# Patient Record
Sex: MALE | Race: WHITE | NOT HISPANIC OR LATINO | URBAN - NONMETROPOLITAN AREA
[De-identification: names, ages, dates, MRNs, and addresses within clinical notes are randomized per-mention and may not be internally consistent; named-entity substitution may affect disease eponyms.]

---

## 2023-04-06 ENCOUNTER — TELEPHONE (OUTPATIENT)
Dept: PRIMARY CARE | Facility: CLINIC | Age: 78
End: 2023-04-06
Payer: COMMERCIAL

## 2023-04-06 DIAGNOSIS — E55.9 VITAMIN D DEFICIENCY: ICD-10-CM

## 2023-04-06 DIAGNOSIS — R53.83 OTHER FATIGUE: ICD-10-CM

## 2023-04-06 DIAGNOSIS — Z12.5 PROSTATE CANCER SCREENING: Primary | ICD-10-CM

## 2023-04-06 DIAGNOSIS — E78.5 DYSLIPIDEMIA: ICD-10-CM

## 2023-04-06 NOTE — TELEPHONE ENCOUNTER
Patient wife called wondering if you could put in lab orders for him to get labs done, there were none put in from his last visit with you in February.

## 2023-07-06 ENCOUNTER — HOSPITAL ENCOUNTER (OUTPATIENT)
Dept: DATA CONVERSION | Facility: HOSPITAL | Age: 78
End: 2023-07-06
Attending: OTOLARYNGOLOGY | Admitting: OTOLARYNGOLOGY
Payer: COMMERCIAL

## 2023-07-06 DIAGNOSIS — Z79.1 LONG TERM (CURRENT) USE OF NON-STEROIDAL ANTI-INFLAMMATORIES (NSAID): ICD-10-CM

## 2023-07-06 DIAGNOSIS — J34.9 UNSPECIFIED DISORDER OF NOSE AND NASAL SINUSES: ICD-10-CM

## 2023-07-06 DIAGNOSIS — E87.6 HYPOKALEMIA: ICD-10-CM

## 2023-07-06 DIAGNOSIS — E55.9 VITAMIN D DEFICIENCY, UNSPECIFIED: ICD-10-CM

## 2023-07-06 DIAGNOSIS — Z86.16 PERSONAL HISTORY OF COVID-19: ICD-10-CM

## 2023-07-06 DIAGNOSIS — H65.91 UNSPECIFIED NONSUPPURATIVE OTITIS MEDIA, RIGHT EAR: ICD-10-CM

## 2023-07-06 DIAGNOSIS — J34.3 HYPERTROPHY OF NASAL TURBINATES: ICD-10-CM

## 2023-07-06 DIAGNOSIS — H61.23 IMPACTED CERUMEN, BILATERAL: ICD-10-CM

## 2023-07-06 DIAGNOSIS — J34.2 DEVIATED NASAL SEPTUM: ICD-10-CM

## 2023-07-06 DIAGNOSIS — E78.5 HYPERLIPIDEMIA, UNSPECIFIED: ICD-10-CM

## 2023-07-06 DIAGNOSIS — R53.82 CHRONIC FATIGUE, UNSPECIFIED: ICD-10-CM

## 2023-07-06 DIAGNOSIS — J32.0 CHRONIC MAXILLARY SINUSITIS: ICD-10-CM

## 2023-07-06 DIAGNOSIS — H73.891 OTHER SPECIFIED DISORDERS OF TYMPANIC MEMBRANE, RIGHT EAR: ICD-10-CM

## 2023-07-06 DIAGNOSIS — Z88.5 ALLERGY STATUS TO NARCOTIC AGENT: ICD-10-CM

## 2023-07-17 PROBLEM — E78.5 DYSLIPIDEMIA: Status: ACTIVE | Noted: 2023-07-17

## 2023-07-17 PROBLEM — M85.80 OSTEOPENIA: Status: ACTIVE | Noted: 2023-07-17

## 2023-07-17 PROBLEM — J30.9 ALLERGIC RHINITIS: Status: ACTIVE | Noted: 2023-07-17

## 2023-07-17 PROBLEM — R53.82 CHRONIC FATIGUE: Status: ACTIVE | Noted: 2023-07-17

## 2023-07-17 PROBLEM — H61.23 BILATERAL IMPACTED CERUMEN: Status: RESOLVED | Noted: 2023-07-17 | Resolved: 2023-07-17

## 2023-07-17 PROBLEM — J18.9 PNEUMONIA: Status: RESOLVED | Noted: 2023-07-17 | Resolved: 2023-07-17

## 2023-07-17 PROBLEM — R09.82 POSTNASAL DISCHARGE: Status: RESOLVED | Noted: 2023-07-17 | Resolved: 2023-07-17

## 2023-07-17 PROBLEM — R94.31 ABNORMAL EKG: Status: RESOLVED | Noted: 2023-07-17 | Resolved: 2023-07-17

## 2023-07-17 PROBLEM — E55.9 VITAMIN D DEFICIENCY: Status: ACTIVE | Noted: 2023-07-17

## 2023-07-17 PROBLEM — U07.1 COVID-19 VIRUS INFECTION: Status: RESOLVED | Noted: 2023-07-17 | Resolved: 2023-07-17

## 2023-07-17 RX ORDER — ALBUTEROL SULFATE 0.83 MG/ML
2.5 SOLUTION RESPIRATORY (INHALATION) EVERY 8 HOURS PRN
COMMUNITY
Start: 2023-02-28 | End: 2023-07-18 | Stop reason: WASHOUT

## 2023-07-17 RX ORDER — ALBUTEROL SULFATE 90 UG/1
2 AEROSOL, METERED RESPIRATORY (INHALATION) 4 TIMES DAILY
COMMUNITY
Start: 2023-02-24 | End: 2023-07-18 | Stop reason: WASHOUT

## 2023-07-17 NOTE — PATIENT INSTRUCTIONS
Please go to the lab at [facility] to have your blood drawn (remember some labs -like lipid panel, require fasting beforehand, but you can have plenty of water, a cup of black coffee, and can take your morning medications).     Thank you for trusting me to be a part of your healthcare.    Take care! Gloria Song PA-C

## 2023-07-17 NOTE — PROGRESS NOTES
Subjective     HPI   Whitley Tom is a 78 y.o. year old male patient with presenting to clinic with concern for   Chief Complaint   Patient presents with    New Patient Visit     Establish care        Mr. Tom presents to clinic to establish care.   Last  colonoscopy?- declines. Had cologuard 2 years ago. Due next year 2024    Req labs Dr Neel jerome diag??  [After visit, records were received and there were no lab results received from the past 2 years. None from [facility], [facility] or  found either]    On Tums 500mg every day and 1 OTVC vit recommended 2000IU every day for osteopenia    Patient Active Problem List   Diagnosis    Allergic rhinitis    Chronic fatigue    Dyslipidemia    Osteopenia    Vitamin D deficiency       Past Medical History:   Diagnosis Date    Abnormal EKG 07/17/2023    Accident on farm     right arm Auger accident    Bilateral impacted cerumen 07/17/2023    [place]hest [place]     [facility] [place] [facility]    Diarrhea, unspecified 11/03/2020    Diarrhea of presumed infectious origin    Fall 2013    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, small brain bleed and hemothorax, fully recovered    Pneumonia 07/17/2023    Postnasal discharge 07/17/2023      Past Surgical History:   Procedure Laterality Date    OTHER SURGICAL HISTORY Left 03/15/2021    Knee surgery      Family History   Problem Relation Name Age of Onset    No Known Problems Other        Social History     Tobacco Use    Smoking status: Never    Smokeless tobacco: Never   Substance Use Topics    Alcohol use: Yes     Comment: rare/wine/beer        Current Outpatient Medications:     albuterol 2.5 mg /3 mL (0.083 %) nebulizer solution, Take 3 mL (2.5 mg) by nebulization every 8 hours if needed., Disp: , Rfl:     albuterol 90 mcg/actuation inhaler, Inhale 2 puffs 4 times a day., Disp: , Rfl:      Review of Systems  Review of Systems:   Constitutional: Denies fever  HEENT: Denies ST, earache  CVS: Denies Chest pain  Pulmonary: Denies wheezing,  "SOB  GI: Denies N/V  : Denies dysuria  Musculoskeletal:  Denies myalgia  Neuro: Denies focal weakness or numbness.  Skin: Denies Rashes.  *Review of Systems is negative unless otherwise mentioned in HPI or ROS above.    Objective   /76   Pulse 73   Temp 37 °C (98.6 °F)   Ht 1.905 m (6' 3\")   Wt 83.5 kg (184 lb)   SpO2 97%   BMI 23.00 kg/m²     Physical Exam  Physical exam:  /76   Pulse 73   Temp 37 °C (98.6 °F)   Ht 1.905 m (6' 3\")   Wt 83.5 kg (184 lb)   SpO2 97%   BMI 23.00 kg/m²  reviewed   Body mass index is 23 kg/m².   Constitutional: NAD.  Resting comfortably.  Head: Atraumatic, normocephalic.  EENT: deferred d/t masking  Cardiac: Regular rate & rhythm.   Pulmonary: Lungs clear bilat  GI: Soft, Nontender, nondistended.   Musculoskeletal: No peripheral edema.   Skin: No evidence of trauma. No rashes  Psych: Intact judgement and insight.    .Assessment/Plan     Problem List Items Addressed This Visit       Osteopenia - Primary    Relevant Orders    Vitamin D, Total     Other Visit Diagnoses       Routine general medical examination at a health care facility        Relevant Orders    CBC    Comprehensive Metabolic Panel    Vitamin D insufficiency        Relevant Orders    Vitamin D, Total    Borderline hyperlipidemia        Relevant Orders    TSH with reflex to Free T4 if abnormal    Lipid Panel    Prostate cancer screening        Relevant Orders    Prostate Specific Antigen, Screen            "

## 2023-07-18 ENCOUNTER — OFFICE VISIT (OUTPATIENT)
Dept: PRIMARY CARE | Facility: CLINIC | Age: 78
End: 2023-07-18
Payer: COMMERCIAL

## 2023-07-18 VITALS
HEIGHT: 75 IN | SYSTOLIC BLOOD PRESSURE: 120 MMHG | BODY MASS INDEX: 22.88 KG/M2 | OXYGEN SATURATION: 97 % | HEART RATE: 73 BPM | WEIGHT: 184 LBS | DIASTOLIC BLOOD PRESSURE: 76 MMHG | TEMPERATURE: 98.6 F

## 2023-07-18 DIAGNOSIS — M85.80 OSTEOPENIA, UNSPECIFIED LOCATION: Primary | ICD-10-CM

## 2023-07-18 DIAGNOSIS — Z00.00 ROUTINE GENERAL MEDICAL EXAMINATION AT A HEALTH CARE FACILITY: ICD-10-CM

## 2023-07-18 DIAGNOSIS — E55.9 VITAMIN D INSUFFICIENCY: ICD-10-CM

## 2023-07-18 DIAGNOSIS — E78.5 BORDERLINE HYPERLIPIDEMIA: ICD-10-CM

## 2023-07-18 DIAGNOSIS — Z12.5 PROSTATE CANCER SCREENING: ICD-10-CM

## 2023-07-18 PROCEDURE — 1160F RVW MEDS BY RX/DR IN RCRD: CPT | Performed by: PHYSICIAN ASSISTANT

## 2023-07-18 PROCEDURE — 1159F MED LIST DOCD IN RCRD: CPT | Performed by: PHYSICIAN ASSISTANT

## 2023-07-18 PROCEDURE — 99214 OFFICE O/P EST MOD 30 MIN: CPT | Performed by: PHYSICIAN ASSISTANT

## 2023-07-18 PROCEDURE — 1036F TOBACCO NON-USER: CPT | Performed by: PHYSICIAN ASSISTANT

## 2023-07-18 RX ORDER — CALCIUM CARBONATE 200(500)MG
1 TABLET,CHEWABLE ORAL DAILY
COMMUNITY

## 2023-07-18 RX ORDER — CHOLECALCIFEROL (VITAMIN D3) 50 MCG
50 TABLET ORAL DAILY
COMMUNITY

## 2023-07-18 ASSESSMENT — PATIENT HEALTH QUESTIONNAIRE - PHQ9
7. TROUBLE CONCENTRATING ON THINGS, SUCH AS READING THE NEWSPAPER OR WATCHING TELEVISION: NOT AT ALL
10. IF YOU CHECKED OFF ANY PROBLEMS, HOW DIFFICULT HAVE THESE PROBLEMS MADE IT FOR YOU TO DO YOUR WORK, TAKE CARE OF THINGS AT HOME, OR GET ALONG WITH OTHER PEOPLE: NOT DIFFICULT AT ALL
9. THOUGHTS THAT YOU WOULD BE BETTER OFF DEAD, OR OF HURTING YOURSELF: NOT AT ALL
5. POOR APPETITE OR OVEREATING: NOT AT ALL
3. TROUBLE FALLING OR STAYING ASLEEP OR SLEEPING TOO MUCH: NOT AT ALL
6. FEELING BAD ABOUT YOURSELF - OR THAT YOU ARE A FAILURE OR HAVE LET YOURSELF OR YOUR FAMILY DOWN: NOT AT ALL
SUM OF ALL RESPONSES TO PHQ QUESTIONS 1-9: 0
SUM OF ALL RESPONSES TO PHQ9 QUESTIONS 1 AND 2: 0
2. FEELING DOWN, DEPRESSED OR HOPELESS: NOT AT ALL
1. LITTLE INTEREST OR PLEASURE IN DOING THINGS: NOT AT ALL
8. MOVING OR SPEAKING SO SLOWLY THAT OTHER PEOPLE COULD HAVE NOTICED. OR THE OPPOSITE, BEING SO FIGETY OR RESTLESS THAT YOU HAVE BEEN MOVING AROUND A LOT MORE THAN USUAL: NOT AT ALL
4. FEELING TIRED OR HAVING LITTLE ENERGY: NOT AT ALL

## 2023-07-18 ASSESSMENT — ANXIETY QUESTIONNAIRES

## 2023-09-29 VITALS — BODY MASS INDEX: 23.3 KG/M2 | WEIGHT: 187.39 LBS | HEIGHT: 75 IN

## 2023-09-30 NOTE — H&P
"    History of Present Illness:   HPI:      Chief Complaint    \"Suggested by Primary (ear wax) sinus\"      Adult Risk ScreeningAdult Risk Screening_UH: There are no spiritual/cultural practices/values/needs that are important to know   Initial Fall Risk Screening:   ZAHRA has not fallen in the last 6 months. His fall did not result in injury. ZAHRA does not have a fear of falling. He does not need assistance with sitting, standing or walking. Does not need assistance walking in his home. He does not need assistance  in an unfamiliar setting. The patient is not using an assistive device.        Tobacco Screening: ZAHRA does not use tobacco.   Domestic Violence Screen: Does not feel threatened or abused physically, emotionally or sexually. Do you feel UNSAFE?   The patient feels safe in the home.   Depression/Suicide Screening:   During the past 2 weeks, the patient has not felt down, depressed or hopeless.    During the past 2 weeks, the patient has not felt little interest or pleasure in doing things.        History of Present Illness  Mr. Tom is a 76 yo M. He comes for nose sinus issues and ear wax.  He has lots of drainage from his sinuses most of the time, mostly postnasal.   It tickles his throat and causes him to cough. This has been going on for decades.  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    History of ear tubes, years ago.  Difficulty with hearing, ears feel full.    On examination, there was wax in ears bilaterally. Cleaning was done. RIght TM is retracted and there is fluid (+). LEft TM looks essentially normal.     Nasal septum is deviated to left anteriorly and to right superiorly and posteriorly  No visible purulent 1  postnasal discharge.     Dx:  1- Deviated nasal septum  2- RIght otitis media with effusion  3- wax build up bilateral (cleaned)    Plan:  1- septoplasty, RF turbinate reduction  2- insertion of right t -tube  3- " hearing testtest  4- clarifix cryotherapy for postnasal thick mucoid 1  discharge1,2  discharge    04.30.2023: CT scan shows deviated nasal septum to left anteriorly and severely deviated nasal septum to right posteriorly. Congestion of right inferior turbinate, left michael bullosa, bilateral mucosal thickenings at maxillary sinuses.    Modified plan is septoplasty, reduction of left michael bullosa, RF inferior turbinate reduction, clarifix cryotherapy for postnasal discharge, limited sinus surgery 66661, 33359.2        1 Amended By: Jesus Gil; Apr 30 2023 2:08 PM EST   2 Amended By: Jesus Gil; Apr 30 2023 2:17 PM EST    Review of Systems  Please see scanned review of systems document in the chart.       Active Problems   · Abnormal EKG (794.31) (R94.31)   · Acute bronchitis (466.0) (J20.9)   · Acute maxillary sinusitis (461.0) (J01.00)   · Allergic rhinitis (477.9) (J30.9)   · Bilateral impacted cerumen (380.4) (H61.23)   · Chronic fatigue (780.79) (R53.82)   · Colon cancer screening (V76.51) (Z12.11)   · Colonoscopy refused (V64.2) (Z53.20)   · COVID-19 virus infection (079.89) (U07.1)   · Diarrhea of presumed infectious origin (009.3) (R19.7)   · Dysfunction of both eustachian tubes (381.81) (H69.83)   · Dyslipidemia (272.4) (E78.5)   · Influenza vaccination declined (V64.06) (Z28.21)   · Non-seasonal allergic rhinitis due to other allergic trigger (477.8) (J30.89)   · Osteopenia (733.90) (M85.80)   · Pneumonia (486) (J18.9)   · Pre-operative clearance (V72.84) (Z01.818)   · Prostate cancer screening (V76.44) (Z12.5)   · Screening for condition (V82.9) (Z13.9)   · Vitamin D deficiency (268.9) (E55.9)    Past Medical History   · History of Diarrhea of presumed infectious origin (009.3) (R19.7)   · Resolved Date: 05 Nov 2020    Surgical History   · History of Knee surgery    Family History   · Family history of atrial fibrillation (V17.49) (Z82.49)    Social History   · Non-smoker (V49.89)  (Z78.9)    Allergies   · Percocet TABS  Recorded By: Viola Alonzo; 3/15/2021 8:45:00 AM    Current Meds    Medication Name Instruction  methylPREDNISolone 4 MG Oral Tablet Therapy Pack TAKE BY MOUTH AS DIRECTED INSIDE PACKAGE  No Reported Medications     Physical Exam  General appearance: Healthy-appearing, well-nourished, well groomed, in no acute distress.     Head and Face: Atraumatic with no masses, lesions, or scarring.     Salivary glands: No tenderness of the parotid glands or parotid masses.     No tenderness of the submandibular glands or submandibular masses.     Facial strength: Normal strength and symmetry, no synkinesis or facial tic.     Eyes: Conjunctivas look non-hyperemic bilaterally    Ears: Bilaterally ear canals look normal. Tympanic membranes intact, no hyperemia, fluid or retraction.     Nose: Mucosa looks normal. No purulent discharge. Septum deviated to left / right / essentially straight.    Oral Cavity/Mouth: Lips and tongue look normal.     Throat: No postnasal discharge. No tonsil hypertrophy. No hyperemia.    Neck: Symmetrical, trachea midline.     Pulmonary: Normal respiratory effort.     Lymphatic: No palpable pathologic lymph nodes at neck.     Neurological/Psychiatric: Orientation to person, place, and time: Normal.   Mood and affect: Normal.     Extremities: No clubbing.       Procedure  NASAL ENDOSCOPY 04.14.2023  0 degree nasal endoscope was advanced through patient's nasal cavities. On examination nasal septum is deviated to left anteriorly and to right superiorly and posteriorly. No polyps or purulent secretions were observed on both sides. No mass or ulceration  was found at nasopharynx.        EAR WAX REMOVAL 04.14.2023  Patient had bilateral ear wax. Using otoscope and small instrument(s) cleaning was done. Patient tolerated the procedure well.       Diagnoses/Problems   · Bilateral impacted cerumen (380.4) (H61.23)   · Deviated nasal septum (470) (J34.2)   · Allergic  rhinitis (477.9) (J30.9)   · Postnasal discharge (473.9) (R09.82)    Orders   · Respiratory Allergy Profile Region 5, IC; Status:Active; Requested for:14Apr2023;    · CT Sinus without Contrast; Status:Hold For - Scheduling; Requested for:14Apr2023;   Patient taking Metformin or Derivatives? : Unknown  Radiologist to Determine Optimal Study : Y  What are the patient's signs and symptoms? : deviated nasal septum difficulty with   breathing from nose despite using flonase spray    Patient Discussion/Summary    Mr. Tom is a 76 yo M. He comes for nose sinus issues and ear wax.  He has lots of drainage from his sinuses most of the time, mostly postnasal.   It tickles his throat and causes him to cough. This has been going on for decades.  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    History of ear tubes, years ago.  Difficulty with hearing, ears feel full.    On examination, there was wax in ears bilaterally. Cleaning was done. RIght TM is retracted and there is fluid (+). LEft TM looks essentially normal.     Nasal septum is deviated to left anteriorly and to right superiorly and posteriorly  No visible purulent 1  postnasal discharge.     Dx:  1- Deviated nasal septum  2- RIght otitis media with effusion  3- wax build up bilateral (cleaned)    Plan:  1- septoplasty, RF turbinate reduction  2- insertion of right t -tube  3- hearing test test  4- clarifix cryotherapy for postnasal thick mucoid 1  discharge1,2  discharge    04.30.2023: CT scan shows deviated nasal septum to left anteriorly and severely deviated nasal septum to right posteriorly. Congestion of right inferior turbinate, left michael bullosa, bilateral mucosal thickenings at maxillary sinuses.    Modified plan is septoplasty, reduction of left michael bullosa, RF inferior turbinate reduction, clarifix cryotherapy for postnasal discharge, limited sinus surgery 94287, 75479.2           Signatures  Electronically signed by : Jesus Gil MD; Apr 30 2023  2:20PM EST (Author)               Allergies:  ·  No Known Allergies :        Intolerances:  ·  Percocet : Unknown    Medications Prior to Admission:   Admission Medication Reconciliation has not been completed for this patient.    Objective:     Objective Information:        Pain reported at 7/6 10:20: 0 = None      Electronic Signatures:  Jesus Gil)  (Signed 06-Jul-2023 11:18)   Authored: History of Present Illness, Comorbidities,  Allergies, Medications Prior to Admission, Objective, Note Completion      Last Updated: 06-Jul-2023 11:18 by Jesus Gil)

## 2023-10-02 NOTE — OP NOTE
Post Operative Note:     PreOp Diagnosis: 1- deviated nasal septum, 2- michael  bullosa (left), 3- chronic postnasal discharge, 4- bilateral maxillary sinusitis, 5- congestion of right inferior turbinate, 6- retraction-adhesion of right tympani   Post-Procedure Diagnosis: 1- deviated nasal septum,   2- michael bullosa (left),   3- chronic postnasal discharge,   4- bilateral maxillary sinusitis,   5- congestion of left inferior turbinate   6- retraction-adhesion of right tympanic membrane  7- right otitis media with effusion   Procedure: 1. septoplasty  2. reduction of left michael bullosa  3. radiofrequency reduction of inferior turbinates  4. partial ethmoidectomy  5. maxillary antrostomy  6. clarifix cryotherapy  7. right myringotomy   Surgeon: Jesus Gil   Resident/Fellow/Other Assistant: none   Anesthesia: general   Estimated Blood Loss (mL): 75   Specimen: no   Findings: septum deviated to left anteriorly, deviated  to right posteriorly, left michael bullosa, bilateral marcos cells, right maxillary antrum was closed. Retraction-adhesion of right TM, right OME.     Attestation:   Note Completion:  Attending Attestation I performed the procedure without a resident   Comments/ Additional Findings    HISTORY:  Mr. Tom is a 76 yo  M. He comes for nose sinus issues and ear wax. He has lots of drainage from his sinuses most of the time, mostly postnasal. It tickles his throat and causes him to cough. This has been going on for decades. 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  History of ear tubes, years ago. Difficulty with hearing, ears feel full.   On examination, there was wax in ears bilaterally. Cleaning was done. Right TM is retracted and there is fluid (+). Left TM looks essentially normal.   Nasal septum is deviated to left anteriorly and to right superiorly and posteriorly  No visible purulent postnasal discharge.     Dx:  1- Deviated nasal  septum  2- Right otitis media with effusion  3- wax build up bilateral (cleaned)    Plan:  1- septoplasty, RF turbinate reduction  2- insertion of right t -tube  3- hearing test   4- clarifix cryotherapy for postnasal thick mucoid discharge  _______________________________________________________________________________________________________  04.30.2023: CT scan shows deviated nasal septum to left anteriorly and severely deviated nasal septum to right posteriorly. Congestion of right inferior turbinate, left michael bullosa, bilateral mucosal thickenings at maxillary sinuses.   Modified plan is septoplasty, reduction of left michael bullosa, RF inferior turbinate reduction, clarifix cryotherapy for postnasal discharge, limited sinus surgery 91244, 98963.2      DESCRIPTION OF THE PROCEDURE:  The patient was brought to the operative suite, placed supine on the operative table.  Time-out was called. Anesthesia was administered by Anesthesia Department.  Please refer to their records for details.  The patient was draped and prepped for the operation.   0 degree endoscope was advanced through patient's nasal cavity. Septum was deviated to right and left. Septal mucosa was infiltrated with lidocaine and epinephrine.  A left modified Zavalla incision was done.  Mucoperichondrial flaps were raised on both  sides.  Yohan knife was used to take out a quadrangular  portion of the cartilaginous septum anteriorly. [place] elevator, D freer elevator, Pituitary forceps, trucut forceps, and rj forceps were used to elevate-cut and remove the deviated portions  of cartilaginous and bony septum. L strut was left to support the dorsum and the tip of the nose.  0-degree endoscope was advanced through the patient's left nasal cavity. Left middle turbinate was medialized using [place] elevator. Backbiter forceps was advanced.  Left uncinate process was cut anteriorly using back-biter forceps.  Then, microdebrider  with 4 mm blade  was used to remove the left uncinate process. Left maxillary antrostomy was done. There was a marcos cell. It was opened. Maxillary ostium was widened. Left ethmoid bulla was opened using microdebrider device, partial ethmoidectomy was  done. Left middle turbinate was michael bullosa it was reduced from its lateral side. Inferior and lateral parts were reduced using radiofrequency device.    0-degree endoscope was advanced through the patient's right nasal cavity.  Right middle turbinate was medialized with freer elevator. Backbiter forceps was advanced. Right uncinate process was cut anteriorly with backbiting forceps.  Then, microdebrider  with 4 mm blade  was used to remove the uncinate process. Right maxillary sinus antrum was closed with a bony lamella and there was a marcos cell.  Marcos cell was opened. Right maxillary sinus ostium was identified and widened. Next, right ethmoid bulla  and anterior ethmoid cell(s) were opened using microdebrider device. Right partial ethmoidectomy was done.  Radiofrequency probe was advanced under endoscopic guidance. Radiofrequency was applied to inferior turbinates at multiple points  (max 20 robertson).   Patient has a history of chronic postnasal discharge. Clarifix cryotherapy was applied to posterior superior part of nasopharynx mucosa to destroy the glands producing thick mucus.   Hemopore was placed to middle meatus bilaterally and to medial parts of middle turbinates. Septum stapler was used to re-attach mucosal flaps of septum.  Septoplasty incision was sutured using 5.0 fast absorbing gut sutures. Merogel was placed to nasal  cavities.   Operating microscope was brought to patient's right ear. On examination, right TM was adhesive over promontorium and was severely retracted adhesive posteriorly. There was effusion in right middle ear. An incision was done to right TM anteroinferiorly,  part of the effusion was suctioned. I tried to place a tube, but my impression it  had the risk of developing cholesteatoma. I took the tube out and placed some merogel under the medial adhesive part of TM to prevent cholesteatoma formation in middle ear.   Procedure was concluded.     DISPOSITION:  Discharge home    MEDICATIONS-RECOMMENDATIONS:  1- Mupirocin ointment twice a day  2- Nasal Rinse  3- Pain medication as needed   4- Oral antibiotic tab    FOLLOW UP:  in 1 month in [place] [facility] office                Electronic Signatures:  Jesus Gil)  (Signed 06-Jul-2023 15:34)   Authored: Post Operative Note, Note Completion      Last Updated: 06-Jul-2023 15:34 by Jesus Gil)

## 2023-10-10 ENCOUNTER — OFFICE VISIT (OUTPATIENT)
Dept: OTOLARYNGOLOGY | Facility: CLINIC | Age: 78
End: 2023-10-10
Payer: COMMERCIAL

## 2023-10-10 DIAGNOSIS — H65.91 OTITIS MEDIA WITH EFFUSION, RIGHT: ICD-10-CM

## 2023-10-10 DIAGNOSIS — H69.92 DISORDER OF LEFT EUSTACHIAN TUBE: ICD-10-CM

## 2023-10-10 DIAGNOSIS — H65.90 OTITIS MEDIA WITH EFFUSION, UNSPECIFIED LATERALITY: Primary | ICD-10-CM

## 2023-10-10 PROCEDURE — 1160F RVW MEDS BY RX/DR IN RCRD: CPT | Performed by: OTOLARYNGOLOGY

## 2023-10-10 PROCEDURE — 69433 CREATE EARDRUM OPENING: CPT | Performed by: OTOLARYNGOLOGY

## 2023-10-10 PROCEDURE — 69420 INCISION OF EARDRUM: CPT | Performed by: OTOLARYNGOLOGY

## 2023-10-10 PROCEDURE — 99214 OFFICE O/P EST MOD 30 MIN: CPT | Performed by: OTOLARYNGOLOGY

## 2023-10-10 PROCEDURE — 1159F MED LIST DOCD IN RCRD: CPT | Performed by: OTOLARYNGOLOGY

## 2023-10-10 PROCEDURE — 1036F TOBACCO NON-USER: CPT | Performed by: OTOLARYNGOLOGY

## 2023-10-10 RX ORDER — OFLOXACIN 3 MG/ML
4 SOLUTION AURICULAR (OTIC) 2 TIMES DAILY
Qty: 5 ML | Refills: 0 | Status: SHIPPED | OUTPATIENT
Start: 2023-10-10 | End: 2023-10-20

## 2023-10-10 RX ORDER — DEXAMETHASONE SODIUM PHOSPHATE 1 MG/ML
SOLUTION/ DROPS OPHTHALMIC
Qty: 5 ML | Refills: 0 | Status: SHIPPED | OUTPATIENT
Start: 2023-10-10 | End: 2024-02-28 | Stop reason: WASHOUT

## 2023-10-10 NOTE — PROGRESS NOTES
Subjective   Patient ID: Whitley Tom is a 78 y.o. male who presents for follow up for ear and nose-sinus issues.    HPI  10/10/2023: No nose and sinus problems essentially. Left ear feels full. He recently had a slight cold.     On examination, right TM is severely retracted and there is fluid in right middle ear. Left TM is intact.    Dx:  1- right otitis media with effusion  2- retraction of right TM  3- left ETD    Today he had insertion of right ventilation tube and left needle myringotomy with intratympanic steroid injection.    Plan:  1- ear drops  2- follow up in 2 weeks    _________________________________________________________________      08.22.2023: He had surgery on 07.06.2023.     Diagnosis:   1- deviated nasal septum,   2- michael bullosa (left),   3- chronic postnasal discharge,   4- bilateral maxillary sinusitis,   5- congestion of left inferior turbinate   6- retraction-adhesion of right tympanic membrane   7- right otitis media with effusion      Procedure:   1. septoplasty   2. reduction of left michael bullosa   3. radiofrequency reduction of inferior turbinates   4. partial ethmoidectomy   5. maxillary antrostomy   6. clarifix cryotherapy   7. right myringotomy      Findings: septum deviated to left anteriorly, deviated to right posteriorly, left michael bullosa, bilateral marcos cells, right maxillary antrum was closed. Retraction-adhesion of right TM, right OME.     On examination, there was crusting over the left inferior turbinate. Cleaning was done. Prominent left nasal septal swell body (+). There was mucopurulent discharge coming out of right middle meatus. Synechia in middle meatus bilaterally. Patient did not use the ointment and nasal rinse as instructed.     Recommendation:  1â€“Daily nasal rinse and antibiotic ointment  2â€“follow-up in 2 months, consider lyses of synechia, if symptomatic.          "  ____________________________________________________________________________________________        Mr. Tom is a 78 yo M. He comes for nose sinus issues and ear wax.  He has lots of drainage from his sinuses most of the time, mostly postnasal.   It tickles his throat and causes him to cough. This has been going on for decades.  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     History of ear tubes, years ago.  Difficulty with hearing, ears feel full.     On examination, there was wax in ears bilaterally. Cleaning was done. RIght TM is retracted and there is fluid (+). LEft TM looks essentially normal.      Nasal septum is deviated to left anteriorly and to right superiorly and posteriorly  No visible purulent postnasal discharge.      Dx:  1- Deviated nasal septum  2- RIght otitis media with effusion  3- wax build up bilateral (cleaned)     Plan:  1- septoplasty, RF turbinate reduction  2- insertion of right t -tube  3- hearing test  4- clarifix cryotherapy for postnasal thick mucoid discharge     04.30.2023: CT scan shows deviated nasal septum to left anteriorly and severely deviated nasal septum to right posteriorly. Congestion of right inferior turbinate, left michael bullosa, bilateral mucosal thickenings at maxillary sinuses.     Modified plan is septoplasty, reduction of left michael bullosa, RF inferior turbinate reduction, clarifix cryotherapy for postnasal discharge, limited sinus surgery 21364, [id number].      Chief Complaint     \"Suggested by Primary (ear wax) sinus\"      Adult Risk ScreeningAdult Risk Screening_: There are no spiritual/cultural practices/values/needs that are important to know   Initial Fall Risk Screening:   ZAHRA has not fallen in the last 6 months. His fall did not result in injury. ZAHRA does not have a fear of falling. He does not need assistance with sitting, standing or walking. Does not need assistance walking in his " home. He does not need assistance in an unfamiliar setting. The patient is not using an assistive device.        Domestic Violence Screen: Does not feel threatened or abused physically, emotionally or sexually. Do you feel UNSAFE?   The patient feels safe in the home.   Depression/Suicide Screening:   During the past 2 weeks, the patient has not felt down, depressed or hopeless.    During the past 2 weeks, the patient has not felt little interest or pleasure in doing things.       Review of Systems  Ear fullness (+)    Objective   Physical Exam  (old exam note)     General appearance: Healthy-appearing, well-nourished, well groomed, in no acute distress.      Head and Face: Atraumatic with no masses, lesions, or scarring.      Salivary glands: No tenderness of the parotid glands or parotid masses.      No tenderness of the submandibular glands or submandibular masses.      Facial strength: Normal strength and symmetry, no synkinesis or facial tic.      Eyes: Conjunctivas look non-hyperemic bilaterally     Ears: Bilaterally ear canals look normal. Tympanic membranes intact, no hyperemia, fluid or retraction.      Nose: Mucosa looks normal. No purulent discharge. Septum deviated to left / right / essentially straight.     Oral Cavity/Mouth: Lips and tongue look normal.      Throat: No postnasal discharge. No tonsil hypertrophy. No hyperemia.     Neck: Symmetrical, trachea midline.      Pulmonary: Normal respiratory effort.      Lymphatic: No palpable pathologic lymph nodes at neck.      Neurological/Psychiatric: Orientation to person, place, and time: Normal.   Mood and affect: Normal.      Extremities: No clubbing.        Procedure  LEFT NEEDLE MYRINGOTOMY and INTRATYMPANIC STEROID INJECTION 10/10/2023  Operative microscope was brought to patient's left ear. Topical phenol was applied posteriorly, then myringotomy was done using 25 g needle and ~0.4 ml 10mg/ml dexamethasone was injected intratympanically. Antibiotic  drops were applied.    RIGHT VENTILATION TUBE INSERTION 10/10/2023  Operative microscope was brought to patient's right ear. Topical phenol was applied anteroinferiorly, then myringotomy was done using 21 g needle. Fluid was suctioned. Brad bobbin ventilation tube was inserted. Antibiotic drops were applied.    Procedure was concluded.       Assessment/Plan   10/10/2023: No nose and sinus problems essentially. Left ear feels full. He recently had a slight cold.     On examination, right TM is severely retracted and there is fluid in right middle ear. Left TM is intact.    Dx:  1- right otitis media with effusion  2- retraction of right TM  3- left ETD    Today he had insertion of right ventilation tube and left needle myringotomy with intratympanic steroid injection.    Plan:  1- ear drops  2- follow up in 2 weeks

## 2023-10-13 ENCOUNTER — OFFICE VISIT (OUTPATIENT)
Dept: OTOLARYNGOLOGY | Facility: CLINIC | Age: 78
End: 2023-10-13
Payer: COMMERCIAL

## 2023-10-13 DIAGNOSIS — Z96.22 MYRINGOTOMY TUBE STATUS: Primary | ICD-10-CM

## 2023-10-13 PROCEDURE — 1036F TOBACCO NON-USER: CPT | Performed by: OTOLARYNGOLOGY

## 2023-10-13 PROCEDURE — 1160F RVW MEDS BY RX/DR IN RCRD: CPT | Performed by: OTOLARYNGOLOGY

## 2023-10-13 PROCEDURE — 99213 OFFICE O/P EST LOW 20 MIN: CPT | Performed by: OTOLARYNGOLOGY

## 2023-10-13 PROCEDURE — 1159F MED LIST DOCD IN RCRD: CPT | Performed by: OTOLARYNGOLOGY

## 2023-10-13 NOTE — PROGRESS NOTES
Subjective   Patient ID: Whitley Tom is a 78 y.o. male who presents for follow up for ear and nose-sinus issues.    [facility]  10/13/2023: He comes for follow up. Nose and sinuses feel good. On examination nasal passages look open bilaterally. Tube in right TM, looks open. No granulation or discharge. Pinpoint opening at left TM posterosuperiorly.     Plan:  1- hearing test  2- follow up in 6 months    _________________________________________________________________    10/10/2023: No nose and sinus problems essentially. Left ear feels full. He recently had a slight cold.     On examination, right TM is severely retracted and there is fluid in right middle ear. Left TM is intact.    Dx:  1- right otitis media with effusion  2- retraction of right TM  3- left ETD    Today he had insertion of right ventilation tube and left needle myringotomy with intratympanic steroid injection.    Plan:  1- ear drops  2- follow up in 2 weeks    _________________________________________________________________      08.22.2023: He had surgery on 07.06.2023.     Diagnosis:   1- deviated nasal septum,   2- michael bullosa (left),   3- chronic postnasal discharge,   4- bilateral maxillary sinusitis,   5- congestion of left inferior turbinate   6- retraction-adhesion of right tympanic membrane   7- right otitis media with effusion      Procedure:   1. septoplasty   2. reduction of left michael bullosa   3. radiofrequency reduction of inferior turbinates   4. partial ethmoidectomy   5. maxillary antrostomy   6. clarifix cryotherapy   7. right myringotomy      Findings: septum deviated to left anteriorly, deviated to right posteriorly, left michael bullosa, bilateral marcos cells, right maxillary antrum was closed. Retraction-adhesion of right TM, right OME.     On examination, there was crusting over the left inferior turbinate. Cleaning was done. Prominent left nasal septal swell body (+). There was mucopurulent discharge coming out of right  "middle meatus. Synechia in middle meatus bilaterally. Patient did not use the ointment and nasal rinse as instructed.     Recommendation:  1â€“Daily nasal rinse and antibiotic ointment  2â€“follow-up in 2 months, consider lyses of synechia, if symptomatic.           ____________________________________________________________________________________________        Mr. Tom is a 78 yo M. He comes for nose sinus issues and ear wax.  He has lots of drainage from his sinuses most of the time, mostly postnasal.   It tickles his throat and causes him to cough. This has been going on for decades.  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     History of ear tubes, years ago.  Difficulty with hearing, ears feel full.     On examination, there was wax in ears bilaterally. Cleaning was done. RIght TM is retracted and there is fluid (+). LEft TM looks essentially normal.      Nasal septum is deviated to left anteriorly and to right superiorly and posteriorly  No visible purulent postnasal discharge.      Dx:  1- Deviated nasal septum  2- RIght otitis media with effusion  3- wax build up bilateral (cleaned)     Plan:  1- septoplasty, RF turbinate reduction  2- insertion of right t -tube  3- hearing test  4- clarifix cryotherapy for postnasal thick mucoid discharge     04.30.2023: CT scan shows deviated nasal septum to left anteriorly and severely deviated nasal septum to right posteriorly. Congestion of right inferior turbinate, left michael bullosa, bilateral mucosal thickenings at maxillary sinuses.     Modified plan is septoplasty, reduction of left michael bullosa, RF inferior turbinate reduction, clarifix cryotherapy for postnasal discharge, limited sinus surgery 30501, [id number].      Chief Complaint     \"Suggested by Primary (ear wax) sinus\"      Adult Risk ScreeningAdult Risk Screening_[facility]: There are no spiritual/cultural practices/values/needs that are important " to know   Initial Fall Risk Screening:   ZAHRA has not fallen in the last 6 months. His fall did not result in injury. ZAHRA does not have a fear of falling. He does not need assistance with sitting, standing or walking. Does not need assistance walking in his home. He does not need assistance in an unfamiliar setting. The patient is not using an assistive device.        Domestic Violence Screen: Does not feel threatened or abused physically, emotionally or sexually. Do you feel UNSAFE?   The patient feels safe in the home.   Depression/Suicide Screening:   During the past 2 weeks, the patient has not felt down, depressed or hopeless.    During the past 2 weeks, the patient has not felt little interest or pleasure in doing things.       Review of Systems  History of rar fullness (+)    Objective   Physical Exam  (old exam note)     General appearance: Healthy-appearing, well-nourished, well groomed, in no acute distress.      Head and Face: Atraumatic with no masses, lesions, or scarring.      Salivary glands: No tenderness of the parotid glands or parotid masses.      No tenderness of the submandibular glands or submandibular masses.      Facial strength: Normal strength and symmetry, no synkinesis or facial tic.      Eyes: Conjunctivas look non-hyperemic bilaterally     Ears: Bilaterally ear canals look normal. Tympanic membranes intact, no hyperemia, fluid or retraction.      Nose: Mucosa looks normal. No purulent discharge. Septum deviated to left / right / essentially straight.     Oral Cavity/Mouth: Lips and tongue look normal.      Throat: No postnasal discharge. No tonsil hypertrophy. No hyperemia.     Neck: Symmetrical, trachea midline.      Pulmonary: Normal respiratory effort.      Lymphatic: No palpable pathologic lymph nodes at neck.      Neurological/Psychiatric: Orientation to person, place, and time: Normal.   Mood and affect: Normal.      Extremities: No clubbing.        Procedure  LEFT NEEDLE  MYRINGOTOMY and INTRATYMPANIC STEROID INJECTION 10/10/2023  Operative microscope was brought to patient's left ear. Topical phenol was applied posteriorly, then myringotomy was done using 25 g needle and ~0.4 ml 10mg/ml dexamethasone was injected intratympanically. Antibiotic drops were applied.    RIGHT VENTILATION TUBE INSERTION 10/10/2023  Operative microscope was brought to patient's right ear. Topical phenol was applied anteroinferiorly, then myringotomy was done using 21 g needle. Fluid was suctioned. Brad bobbin ventilation tube was inserted. Antibiotic drops were applied.    Procedure was concluded.       Assessment/Plan   10/13/2023: He comes for follow up. Nose and sinuses feel good. On examination nasal passages look open bilaterally. Tube in right TM, looks open. No granulation or discharge. Pinpoint opening at left TM posterosuperiorly.     Plan:  1- hearing test  2- follow up in 6 months    _________________________________________________________________    10/10/2023: No nose and sinus problems essentially. Left ear feels full. He recently had a slight cold.     On examination, right TM is severely retracted and there is fluid in right middle ear. Left TM is intact.    Dx:  1- right otitis media with effusion  2- retraction of right TM  3- left ETD    Today he had insertion of right ventilation tube and left needle myringotomy with intratympanic steroid injection.    Plan:  1- ear drops  2- follow up in 2 weeks

## 2023-10-27 ENCOUNTER — APPOINTMENT (OUTPATIENT)
Dept: OTOLARYNGOLOGY | Facility: CLINIC | Age: 78
End: 2023-10-27
Payer: COMMERCIAL

## 2023-11-16 ENCOUNTER — TELEPHONE (OUTPATIENT)
Dept: HEMATOLOGY/ONCOLOGY | Facility: HOSPITAL | Age: 78
End: 2023-11-16
Payer: COMMERCIAL

## 2023-11-16 NOTE — TELEPHONE ENCOUNTER
Received paper order for Prolia from Dr. Tai Shaikh. Sent order to pharmacy to enter into [employer]. Email sent to pre-cert to start auth. Per wife, first dose of Prolia. Labs needed prior. Per order, okay to use labs dated 11/9/23. Reached out to wife to schedule. Patient requested for this year. Scheduled for 12/4/23 at 8:30 AM. Wife verbalized and agreed to appointment.

## 2023-11-27 ENCOUNTER — OFFICE VISIT (OUTPATIENT)
Dept: OTOLARYNGOLOGY | Facility: CLINIC | Age: 78
End: 2023-11-27
Payer: COMMERCIAL

## 2023-11-27 ENCOUNTER — CLINICAL SUPPORT (OUTPATIENT)
Dept: AUDIOLOGY | Facility: CLINIC | Age: 78
End: 2023-11-27
Payer: COMMERCIAL

## 2023-11-27 DIAGNOSIS — H90.6 MIXED CONDUCTIVE AND SENSORINEURAL HEARING LOSS OF BOTH EARS: Primary | ICD-10-CM

## 2023-11-27 DIAGNOSIS — H91.93 DECREASED HEARING OF BOTH EARS: ICD-10-CM

## 2023-11-27 DIAGNOSIS — R94.128: ICD-10-CM

## 2023-11-27 DIAGNOSIS — R94.120 ABNORMALLY COMPLIANT LEFT MIDDLE EAR SYSTEM WITH TYPE AD TYMPANOGRAM CURVE: ICD-10-CM

## 2023-11-27 DIAGNOSIS — Z96.22 MYRINGOTOMY TUBE STATUS: Primary | ICD-10-CM

## 2023-11-27 PROCEDURE — 92567 TYMPANOMETRY: CPT | Performed by: AUDIOLOGIST

## 2023-11-27 PROCEDURE — 1126F AMNT PAIN NOTED NONE PRSNT: CPT | Performed by: OTOLARYNGOLOGY

## 2023-11-27 PROCEDURE — 1160F RVW MEDS BY RX/DR IN RCRD: CPT | Performed by: OTOLARYNGOLOGY

## 2023-11-27 PROCEDURE — 99213 OFFICE O/P EST LOW 20 MIN: CPT | Performed by: OTOLARYNGOLOGY

## 2023-11-27 PROCEDURE — 92557 COMPREHENSIVE HEARING TEST: CPT | Performed by: AUDIOLOGIST

## 2023-11-27 PROCEDURE — 1159F MED LIST DOCD IN RCRD: CPT | Performed by: OTOLARYNGOLOGY

## 2023-11-27 PROCEDURE — 1036F TOBACCO NON-USER: CPT | Performed by: OTOLARYNGOLOGY

## 2023-11-27 RX ORDER — OFLOXACIN 3 MG/ML
SOLUTION AURICULAR (OTIC)
Qty: 5 ML | Refills: 0 | Status: SHIPPED | OUTPATIENT
Start: 2023-11-27 | End: 2024-05-28 | Stop reason: WASHOUT

## 2023-11-27 RX ORDER — DEXAMETHASONE SODIUM PHOSPHATE 1 MG/ML
SOLUTION/ DROPS OPHTHALMIC
Qty: 5 ML | Refills: 0 | Status: SHIPPED | OUTPATIENT
Start: 2023-11-27 | End: 2024-05-28 | Stop reason: WASHOUT

## 2023-11-27 ASSESSMENT — ENCOUNTER SYMPTOMS
DEPRESSION: 0
OCCASIONAL FEELINGS OF UNSTEADINESS: 0
LOSS OF SENSATION IN FEET: 0

## 2023-11-27 ASSESSMENT — PAIN - FUNCTIONAL ASSESSMENT: PAIN_FUNCTIONAL_ASSESSMENT: 0-10

## 2023-11-27 ASSESSMENT — PAIN SCALES - GENERAL: PAINLEVEL_OUTOF10: 0 - NO PAIN

## 2023-11-27 NOTE — PROGRESS NOTES
Whitley Tom, age 78 years, is here today for a hearing evaluation.     Difficulty hearing - yes, both ears (left ear worse)  Tinnitus - no  Excessive noise exposure - yes, occupational   Chronic ear infections - yes, childhood  Ear pain - no  Ear drainage - no  Past ear surgery - yes, ear ventilation tube in the right ear and a tube in the past (he does not recall)  Vertigo - no  Dizziness - no  Past hearing aid use - no  Family history - no    Appointment time: 13:30-14    Otoscopy revealed clear ear canals with visual inspection of the tympanic membranes bilaterally.    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    Word discrimination:  Right ear - excellent (100%)  Left ear - excellent (92%)    Tympanometry:  Right ear - Type B, no pressure peak with a large ear canal volume (indicative of a patent ear ventilation tube)   Left ear - Type Ad, eardrum hypermobility with normal ear canal volume and normal middle ear pressure    Ipsilateral acoustic reflexes:  Right ear - absent at 1000 Hz  Left ear - present 500-4000 Hz     Recommendations:  1) Follow up with Dr Gil regarding asymmetric mixed hearing loss (primarily sensorineural) - cannot rule out retrocochlear dysfunction  2) Consider hearing aids  3) Re-evaluate hearing annually, to monitor, or sooner if a change in hearing is noted

## 2023-11-27 NOTE — PROGRESS NOTES
Subjective   Patient ID: Whitley Tom is a 78 y.o. male who presents for follow up for ear and nose-sinus issues.    HPI    11.27.2023: He had his hearing test today.  At left ear there is sloping type of hearing loss at high frequencies and mild slope at right ear at high and at low frequencies.  He feels like his hearing at left is not good good as his hearing at right.  Since most of his problem is sensorineural, I recommend to consider using a hearing aid for the left ear.  On examination, ventilation tube seems to be touching right middle ear mucosa, which may cause chronic inflammation over time.    Recommendations:  1-use eardrops once a week to suppress foreign body reaction at right ear  2-consider using a hearing aid for the left ear    _________________________________________________________________    10/13/2023: He comes for follow up. Nose and sinuses feel good. On examination nasal passages look open bilaterally. Tube in right TM, looks open. No granulation or discharge. Pinpoint opening at left TM posterosuperiorly.     Plan:  1- hearing test  2- follow up in 6 months    _________________________________________________________________    10/10/2023: No nose and sinus problems essentially. Left ear feels full. He recently had a slight cold.     On examination, right TM is severely retracted and there is fluid in right middle ear. Left TM is intact.    Dx:  1- right otitis media with effusion  2- retraction of right TM  3- left ETD    Today he had insertion of right ventilation tube and left needle myringotomy with intratympanic steroid injection.    Plan:  1- ear drops  2- follow up in 2 weeks    _________________________________________________________________      08.22.2023: He had surgery on 07.06.2023.     Diagnosis:   1- deviated nasal septum,   2- michael bullosa (left),   3- chronic postnasal discharge,   4- bilateral maxillary sinusitis,   5- congestion of left inferior turbinate   6-  retraction-adhesion of right tympanic membrane   7- right otitis media with effusion      Procedure:   1. septoplasty   2. reduction of left michael bullosa   3. radiofrequency reduction of inferior turbinates   4. partial ethmoidectomy   5. maxillary antrostomy   6. clarifix cryotherapy   7. right myringotomy      Findings: septum deviated to left anteriorly, deviated to right posteriorly, left michael bullosa, bilateral marcos cells, right maxillary antrum was closed. Retraction-adhesion of right TM, right OME.     On examination, there was crusting over the left inferior turbinate. Cleaning was done. Prominent left nasal septal swell body (+). There was mucopurulent discharge coming out of right middle meatus. Synechia in middle meatus bilaterally. Patient did not use the ointment and nasal rinse as instructed.     Recommendation:  1â€“Daily nasal rinse and antibiotic ointment  2â€“follow-up in 2 months, consider lyses of synechia, if symptomatic.           ____________________________________________________________________________________________        Mr. Tom is a 76 yo M. He comes for nose sinus issues and ear wax.  He has lots of drainage from his sinuses most of the time, mostly postnasal.   It tickles his throat and causes him to cough. This has been going on for decades.  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     History of ear tubes, years ago.  Difficulty with hearing, ears feel full.     On examination, there was wax in ears bilaterally. Cleaning was done. RIght TM is retracted and there is fluid (+). LEft TM looks essentially normal.      Nasal septum is deviated to left anteriorly and to right superiorly and posteriorly  No visible purulent postnasal discharge.      Dx:  1- Deviated nasal septum  2- RIght otitis media with effusion  3- wax build up bilateral (cleaned)     Plan:  1- septoplasty, RF turbinate reduction  2- insertion of  "right t -tube  3- hearing test  4- clarifix cryotherapy for postnasal thick mucoid discharge   _________________________________________________________________    04.30.2023: CT scan shows deviated nasal septum to left anteriorly and severely deviated nasal septum to right posteriorly. Congestion of right inferior turbinate, left mihcael bullosa, bilateral mucosal thickenings at maxillary sinuses.     Modified plan is septoplasty, reduction of left michael bullosa, RF inferior turbinate reduction, clarifix cryotherapy for postnasal discharge, limited sinus surgery [id number], [id number].      Chief Complaint     \"Suggested by Primary (ear wax) sinus\"      Adult Risk ScreeningAdult Risk Screening_UH: There are no spiritual/cultural practices/values/needs that are important to know   Initial Fall Risk Screening:   ZAHRA has not fallen in the last 6 months. His fall did not result in injury. ZAHRA does not have a fear of falling. He does not need assistance with sitting, standing or walking. Does not need assistance walking in his home. He does not need assistance in an unfamiliar setting. The patient is not using an assistive device.        Domestic Violence Screen: Does not feel threatened or abused physically, emotionally or sexually. Do you feel UNSAFE?   The patient feels safe in the home.   Depression/Suicide Screening:   During the past 2 weeks, the patient has not felt down, depressed or hopeless.    During the past 2 weeks, the patient has not felt little interest or pleasure in doing things.       Review of Systems  History of rar fullness (+)    Objective   Physical Exam  (old exam note)     General appearance: Healthy-appearing, well-nourished, well groomed, in no acute distress.      Head and Face: Atraumatic with no masses, lesions, or scarring.      Salivary glands: No tenderness of the parotid glands or parotid masses.      No tenderness of the submandibular glands or submandibular masses.      Facial strength: " Normal strength and symmetry, no synkinesis or facial tic.      Eyes: Conjunctivas look non-hyperemic bilaterally     Ears: Bilaterally ear canals look normal. Tympanic membranes intact, no hyperemia, fluid or retraction.      Nose: Mucosa looks normal. No purulent discharge. Septum deviated to left / right / essentially straight.     Oral Cavity/Mouth: Lips and tongue look normal.      Throat: No postnasal discharge. No tonsil hypertrophy. No hyperemia.     Neck: Symmetrical, trachea midline.      Pulmonary: Normal respiratory effort.      Lymphatic: No palpable pathologic lymph nodes at neck.      Neurological/Psychiatric: Orientation to person, place, and time: Normal.   Mood and affect: Normal.      Extremities: No clubbing.        Procedure  LEFT NEEDLE MYRINGOTOMY and INTRATYMPANIC STEROID INJECTION 10/10/2023  Operative microscope was brought to patient's left ear. Topical phenol was applied posteriorly, then myringotomy was done using 25 g needle and ~0.4 ml 10mg/ml dexamethasone was injected intratympanically. Antibiotic drops were applied.    RIGHT VENTILATION TUBE INSERTION 10/10/2023  Operative microscope was brought to patient's right ear. Topical phenol was applied anteroinferiorly, then myringotomy was done using 21 g needle. Fluid was suctioned. Brad bobbin ventilation tube was inserted. Antibiotic drops were applied.    Procedure was concluded.       Assessment/Plan     11.27.2023: He had his hearing test today.  At left ear there is sloping type of hearing loss at high frequencies and mild slope at right ear at high and at low frequencies.  He feels like his hearing at left is not good good as his hearing at right.  Since most of his problem is sensorineural, I recommend to consider using a hearing aid for the left ear.  On examination, ventilation tube seems to be touching right middle ear mucosa, which may cause chronic inflammation over time.    Recommendations:  1-use eardrops once a week to  suppress foreign body reaction at right ear  2-consider using a hearing aid for the left ear    _________________________________________________________________    10/13/2023: He comes for follow up. Nose and sinuses feel good. On examination nasal passages look open bilaterally. Tube in right TM, looks open. No granulation or discharge. Pinpoint opening at left TM posterosuperiorly.     Plan:  1- hearing test  2- follow up in 6 months    _________________________________________________________________    10/10/2023: No nose and sinus problems essentially. Left ear feels full. He recently had a slight cold.     On examination, right TM is severely retracted and there is fluid in right middle ear. Left TM is intact.    Dx:  1- right otitis media with effusion  2- retraction of right TM  3- left ETD    Today he had insertion of right ventilation tube and left needle myringotomy with intratympanic steroid injection.    Plan:  1- ear drops  2- follow up in 2 weeks

## 2023-12-01 ENCOUNTER — TELEPHONE (OUTPATIENT)
Dept: HEMATOLOGY/ONCOLOGY | Facility: HOSPITAL | Age: 78
End: 2023-12-01
Payer: COMMERCIAL

## 2023-12-01 PROBLEM — M81.0 OSTEOPOROSIS: Status: ACTIVE | Noted: 2023-12-01

## 2023-12-01 RX ORDER — DIPHENHYDRAMINE HYDROCHLORIDE 50 MG/ML
50 INJECTION INTRAMUSCULAR; INTRAVENOUS AS NEEDED
Status: CANCELLED | OUTPATIENT
Start: 2023-12-04

## 2023-12-01 RX ORDER — EPINEPHRINE 0.3 MG/.3ML
0.3 INJECTION SUBCUTANEOUS EVERY 5 MIN PRN
Status: CANCELLED | OUTPATIENT
Start: 2023-12-04

## 2023-12-01 RX ORDER — ALBUTEROL SULFATE 0.83 MG/ML
3 SOLUTION RESPIRATORY (INHALATION) AS NEEDED
Status: CANCELLED | OUTPATIENT
Start: 2023-12-04

## 2023-12-01 RX ORDER — FAMOTIDINE 10 MG/ML
20 INJECTION INTRAVENOUS ONCE AS NEEDED
Status: CANCELLED | OUTPATIENT
Start: 2023-12-04

## 2023-12-01 NOTE — TELEPHONE ENCOUNTER
Patient scheduled for [facility] on Monday, 12/4/23 at 8:30 AM. Pre-cert is still pending review. Reached out to patient to inform him of pre-cert status. Left detailed message, with call back number, to call back at earliest convenience to reschedule.

## 2023-12-04 ENCOUNTER — APPOINTMENT (OUTPATIENT)
Dept: HEMATOLOGY/ONCOLOGY | Facility: HOSPITAL | Age: 78
End: 2023-12-04
Payer: COMMERCIAL

## 2023-12-04 NOTE — TELEPHONE ENCOUNTER
Patient scheduled for Prolia this morning at 8:30 AM. Pre-cert is still pending review. Reached out to patient to inform him of pre-cert status. Spoke with wife, Gricelda. Informed her appointment needs to be rescheduled due to pre-cert status. Rescheduled patient to next Tuesday, 12/12/23 at 8:30 AM. Wife verbalized understanding and agreed to appointment change.

## 2023-12-12 ENCOUNTER — INFUSION (OUTPATIENT)
Dept: HEMATOLOGY/ONCOLOGY | Facility: HOSPITAL | Age: 78
End: 2023-12-12
Payer: COMMERCIAL

## 2023-12-12 VITALS
HEART RATE: 82 BPM | DIASTOLIC BLOOD PRESSURE: 83 MMHG | BODY MASS INDEX: 22.98 KG/M2 | WEIGHT: 183.86 LBS | OXYGEN SATURATION: 98 % | TEMPERATURE: 97.5 F | SYSTOLIC BLOOD PRESSURE: 133 MMHG | RESPIRATION RATE: 16 BRPM

## 2023-12-12 DIAGNOSIS — M81.0 OSTEOPOROSIS, UNSPECIFIED OSTEOPOROSIS TYPE, UNSPECIFIED PATHOLOGICAL FRACTURE PRESENCE: ICD-10-CM

## 2023-12-12 PROCEDURE — 2500000004 HC RX 250 GENERAL PHARMACY W/ HCPCS (ALT 636 FOR OP/ED): Mod: JZ | Performed by: INTERNAL MEDICINE

## 2023-12-12 PROCEDURE — 96372 THER/PROPH/DIAG INJ SC/IM: CPT

## 2023-12-12 RX ORDER — DIPHENHYDRAMINE HYDROCHLORIDE 50 MG/ML
50 INJECTION INTRAMUSCULAR; INTRAVENOUS AS NEEDED
Status: CANCELLED | OUTPATIENT
Start: 2023-12-12

## 2023-12-12 RX ORDER — ALBUTEROL SULFATE 0.83 MG/ML
3 SOLUTION RESPIRATORY (INHALATION) AS NEEDED
Status: CANCELLED | OUTPATIENT
Start: 2023-12-12

## 2023-12-12 RX ORDER — FAMOTIDINE 10 MG/ML
20 INJECTION INTRAVENOUS ONCE AS NEEDED
Status: CANCELLED | OUTPATIENT
Start: 2023-12-12

## 2023-12-12 RX ORDER — EPINEPHRINE 0.3 MG/.3ML
0.3 INJECTION SUBCUTANEOUS EVERY 5 MIN PRN
Status: CANCELLED | OUTPATIENT
Start: 2023-12-12

## 2023-12-12 RX ADMIN — DENOSUMAB 60 MG: 60 INJECTION SUBCUTANEOUS at 08:37

## 2023-12-12 ASSESSMENT — PATIENT HEALTH QUESTIONNAIRE - PHQ9
SUM OF ALL RESPONSES TO PHQ9 QUESTIONS 1 & 2: 0
2. FEELING DOWN, DEPRESSED OR HOPELESS: NOT AT ALL
1. LITTLE INTEREST OR PLEASURE IN DOING THINGS: NOT AT ALL

## 2023-12-12 ASSESSMENT — COLUMBIA-SUICIDE SEVERITY RATING SCALE - C-SSRS
2. HAVE YOU ACTUALLY HAD ANY THOUGHTS OF KILLING YOURSELF?: NO
6. HAVE YOU EVER DONE ANYTHING, STARTED TO DO ANYTHING, OR PREPARED TO DO ANYTHING TO END YOUR LIFE?: NO
1. IN THE PAST MONTH, HAVE YOU WISHED YOU WERE DEAD OR WISHED YOU COULD GO TO SLEEP AND NOT WAKE UP?: NO

## 2023-12-12 ASSESSMENT — PAIN SCALES - GENERAL: PAINLEVEL: 0-NO PAIN

## 2024-01-02 ENCOUNTER — OFFICE VISIT (OUTPATIENT)
Dept: PRIMARY CARE | Facility: CLINIC | Age: 79
End: 2024-01-02
Payer: COMMERCIAL

## 2024-01-02 VITALS
SYSTOLIC BLOOD PRESSURE: 125 MMHG | HEIGHT: 75 IN | WEIGHT: 184.4 LBS | DIASTOLIC BLOOD PRESSURE: 80 MMHG | TEMPERATURE: 97.3 F | HEART RATE: 74 BPM | BODY MASS INDEX: 22.93 KG/M2 | OXYGEN SATURATION: 98 %

## 2024-01-02 DIAGNOSIS — J02.9 SORE THROAT: ICD-10-CM

## 2024-01-02 DIAGNOSIS — J01.00 ACUTE MAXILLARY SINUSITIS, RECURRENCE NOT SPECIFIED: Primary | ICD-10-CM

## 2024-01-02 LAB — POC RAPID STREP: NEGATIVE

## 2024-01-02 PROCEDURE — 99213 OFFICE O/P EST LOW 20 MIN: CPT | Performed by: FAMILY MEDICINE

## 2024-01-02 PROCEDURE — 1126F AMNT PAIN NOTED NONE PRSNT: CPT | Performed by: FAMILY MEDICINE

## 2024-01-02 PROCEDURE — 87880 STREP A ASSAY W/OPTIC: CPT | Performed by: FAMILY MEDICINE

## 2024-01-02 PROCEDURE — 1036F TOBACCO NON-USER: CPT | Performed by: FAMILY MEDICINE

## 2024-01-02 PROCEDURE — 1159F MED LIST DOCD IN RCRD: CPT | Performed by: FAMILY MEDICINE

## 2024-01-02 RX ORDER — AMOXICILLIN AND CLAVULANATE POTASSIUM 875; 125 MG/1; MG/1
875 TABLET, FILM COATED ORAL 2 TIMES DAILY
Qty: 20 TABLET | Refills: 0 | Status: SHIPPED | OUTPATIENT
Start: 2024-01-02 | End: 2024-01-14 | Stop reason: WASHOUT

## 2024-01-02 ASSESSMENT — ENCOUNTER SYMPTOMS
DEPRESSION: 0
LOSS OF SENSATION IN FEET: 0
OCCASIONAL FEELINGS OF UNSTEADINESS: 0

## 2024-01-02 ASSESSMENT — PATIENT HEALTH QUESTIONNAIRE - PHQ9
1. LITTLE INTEREST OR PLEASURE IN DOING THINGS: NOT AT ALL
SUM OF ALL RESPONSES TO PHQ9 QUESTIONS 1 AND 2: 0
2. FEELING DOWN, DEPRESSED OR HOPELESS: NOT AT ALL

## 2024-01-02 NOTE — PROGRESS NOTES
"Subjective   Patient ID: Whitley Tom is a 78 y.o. male who presents for Sore Throat (Started Saturday, nothing helps).      HPI  Patient of Gloria's who is out of the office today, has been having sore throat and cough x 5 days, getting worse. No sob or wheezing. Has sinus pressure and headache. No fever. No body aches, fatigue. No nausea/vomiting/diarrhea    Review of Systems  As per HPI    /80   Pulse 74   Temp 36.3 °C (97.3 °F)   Ht 1.905 m (6' 3\")   Wt 83.6 kg (184 lb 6.4 oz)   SpO2 98%   BMI 23.05 kg/m²       Objective   Physical Exam  Gen: NAD, alert  Head: normocephalic/atraumatic  Eyes: conjunctivae normal  Ears: canals clear bilaterally, TM normal   Nose: external nose normal , maxillary sinus tenderness present  Oropharynx: erythematous posterior oropharynx  Resp: decreased breath sounds  CVS: Regular rate and rhythm  Abdomen: soft, NT, ND  Ext: no edema, NT of lower extremities  Neuro: gait normal     Assessment/Plan   Problem List Items Addressed This Visit    None  Visit Diagnoses       Acute maxillary sinusitis, recurrence not specified    -  Primary    Relevant Medications    amoxicillin-pot clavulanate (Augmentin) 875-125 mg tablet    Sore throat        Relevant Orders    POCT rapid strep A manually resulted (Completed)               "

## 2024-01-08 ENCOUNTER — TELEPHONE (OUTPATIENT)
Dept: PRIMARY CARE | Facility: CLINIC | Age: 79
End: 2024-01-08
Payer: COMMERCIAL

## 2024-01-08 DIAGNOSIS — J01.00 ACUTE MAXILLARY SINUSITIS, RECURRENCE NOT SPECIFIED: Primary | ICD-10-CM

## 2024-01-08 RX ORDER — PROMETHAZINE HYDROCHLORIDE AND DEXTROMETHORPHAN HYDROBROMIDE 6.25; 15 MG/5ML; MG/5ML
5 SYRUP ORAL 4 TIMES DAILY PRN
Qty: 120 ML | Refills: 0 | Status: SHIPPED | OUTPATIENT
Start: 2024-01-08 | End: 2024-02-07

## 2024-01-15 NOTE — PROGRESS NOTES
Subjective     HPI   Whitley Tom is a 78 y.o. year old male patient with presenting to clinic with concern for   Chief Complaint   Patient presents with    Follow-up     6 mth. Saw dr. Krishnamurthy a few days. Sore throat, congestion, cough. Still coughing up clear mucus.        Had cologuard 3 years ago wnl. 3/15/22. Declined colonoscopy    Frontal sinus pressure has been relieved, but ongoing cough. Improves using nebulizer machine, but isn't using it often per his wife. Still has some mild post nasal drip, but is wheezing.    Labs were received from Dr. Shaikh's office as well as recent notes. Dr. Shaikh is retiring they are looking for new rheumatologist for infusions.    On Tums 500mg every day and 1 OTC vit recommended 2000IU every day for osteopenia, Family hx osteoporosis    Will be looking for a new rheumatologist. May need prolia orders, but labs are gotten at [facility] outside of the  system, not through the order set at  lab. I agreed to order next dose if he is not scheduled with new rheumatologist by that time.    Patient Active Problem List   Diagnosis    Allergic rhinitis    Chronic fatigue    Dyslipidemia    Osteopenia    Vitamin D deficiency    Otitis media with effusion, right    Disorder of left eustachian tube    Myringotomy tube status    Osteoporosis       Past Medical History:   Diagnosis Date    Abnormal EKG 07/17/2023    Accident [place] farm     right arm Auger accident    Bilateral impacted cerumen 07/17/2023    Chest injury     kicked in [facility] by a horse, observed at [facility]    Diarrhea, unspecified 11/03/2020    Diarrhea of presumed infectious origin    Fall 2013    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, small brain bleed and hemothorax, fully recovered    Pneumonia 07/17/2023    Postnasal discharge 07/17/2023      Past Surgical History:   Procedure Laterality Date    OTHER SURGICAL HISTORY Left 03/15/2021    Knee surgery      Family History   Problem Relation Name Age of Onset    Heart disease Mother       "Hypertension Father      No Known Problems Other        Social History     Tobacco Use    Smoking status: Never    Smokeless tobacco: Never   Substance Use Topics    Alcohol use: Yes     Comment: rare/wine/beer        Current Outpatient Medications:     calcium carbonate (Tums) 200 mg calcium chewable tablet, Chew 1 tablet (500 mg) once daily. OTC For osteopenia calcium supplementation, Disp: , Rfl:     cholecalciferol (Vitamin D-3) 50 MCG (2000 UT) tablet, Take 1 tablet (50 mcg) by mouth once daily. OTC, Disp: , Rfl:     dexAMETHasone (Decadron) 0.1 % ophthalmic solution, Instill 4 drops into affected ear(s); twice a day; for 10 days, Disp: 5 mL, Rfl: 0    dexAMETHasone (Decadron) 0.1 % ophthalmic solution, Instill 3 drops into affected ear(s), once a week, for 5 months, Disp: 5 mL, Rfl: 0    ofloxacin (Floxin) 0.3 % otic solution, Instill 3 drops into affected ear(s), once a week, for 5 months, Disp: 5 mL, Rfl: 0    promethazine-DM (Phenergan-DM) 6.25-15 mg/5 mL syrup, Take 5 mL by mouth 4 times a day as needed for cough., Disp: 120 mL, Rfl: 0    sod chloride-sodium bicarb (Ayr) nasal rinse, USE 2 APPLICATIONS BY NASAL ROUTE 2 TIMES A DAY AS DIRECTED, Disp: 50 each, Rfl: 0    azithromycin (Zithromax) 250 mg tablet, Take 2 tablets (500 mg) by mouth once daily for 1 day, THEN 1 tablet (250 mg) once daily for 4 days. Take 2 tabs (500 mg) by mouth today, than 1 daily for 4 days.., Disp: 6 tablet, Rfl: 0     Review of Systems  Constitutional: Denies fever  HEENT: Denies ST, earache  CVS: Denies Chest pain  Pulmonary: Denies wheezing, SOB  GI: Denies N/V  : Denies dysuria  Musculoskeletal:  Denies myalgia  Neuro: Denies focal weakness or numbness.  Skin: Denies Rashes.  *Review of Systems is negative unless otherwise mentioned in HPI or ROS above.    Objective   /80   Pulse 84   Temp 37 °C (98.6 °F)   Ht 1.905 m (6' 3\")   Wt 82.6 kg (182 lb)   SpO2 97%   BMI 22.75 kg/m²  reviewed Body mass index is 22.75 " kg/m².     Physical Exam  Constitutional: NAD.  Resting comfortably.  Head: Atraumatic, normocephalic.  Eyes: PERRLA. EOM intact. Non icteric. Noninjected conjunctiva.  ENT: moist oral mucosa. Nasal mucosa mildly edematous and erythematous. Posterior oropharynx nonerythematous with clear posterior pharyngeal streaking.  TM: R TM nonerythematous, pearly grey, landmarks intact. L Tm mildly buldging. EAC wnl bilat.  Lymph: No submandibular lymphadenopathy. Mild upper anterior chain lymphadenopathy. No mastoid tenderness.   Neck is supple. No meningismus.  Cardiac: Regular rate & rhythm.   Pulmonary: Lungs clear bilat. Scattered end expiratory wheezing   GI: Soft, Nontender, nondistended.   Musculoskeletal: No peripheral edema.   Skin: No evidence of trauma. No rashes  Psych: Intact judgement and insight.    .Assessment/Plan   Problem List Items Addressed This Visit    None  Visit Diagnoses         Codes    Bronchitis    -  Primary J40    Relevant Medications    azithromycin (Zithromax) 250 mg tablet    Acute serous otitis media of left ear, recurrence not specified     H65.02

## 2024-01-16 ENCOUNTER — OFFICE VISIT (OUTPATIENT)
Dept: PRIMARY CARE | Facility: CLINIC | Age: 79
End: 2024-01-16
Payer: COMMERCIAL

## 2024-01-16 VITALS
BODY MASS INDEX: 22.63 KG/M2 | HEIGHT: 75 IN | WEIGHT: 182 LBS | OXYGEN SATURATION: 97 % | HEART RATE: 84 BPM | DIASTOLIC BLOOD PRESSURE: 80 MMHG | SYSTOLIC BLOOD PRESSURE: 112 MMHG | TEMPERATURE: 98.6 F

## 2024-01-16 DIAGNOSIS — J40 BRONCHITIS: Primary | ICD-10-CM

## 2024-01-16 DIAGNOSIS — H65.02 ACUTE SEROUS OTITIS MEDIA OF LEFT EAR, RECURRENCE NOT SPECIFIED: ICD-10-CM

## 2024-01-16 PROCEDURE — 1160F RVW MEDS BY RX/DR IN RCRD: CPT | Performed by: PHYSICIAN ASSISTANT

## 2024-01-16 PROCEDURE — 1036F TOBACCO NON-USER: CPT | Performed by: PHYSICIAN ASSISTANT

## 2024-01-16 PROCEDURE — 99214 OFFICE O/P EST MOD 30 MIN: CPT | Performed by: PHYSICIAN ASSISTANT

## 2024-01-16 PROCEDURE — 1159F MED LIST DOCD IN RCRD: CPT | Performed by: PHYSICIAN ASSISTANT

## 2024-01-16 PROCEDURE — 1126F AMNT PAIN NOTED NONE PRSNT: CPT | Performed by: PHYSICIAN ASSISTANT

## 2024-01-16 RX ORDER — AZITHROMYCIN 250 MG/1
TABLET, FILM COATED ORAL
Qty: 6 TABLET | Refills: 0 | Status: SHIPPED | OUTPATIENT
Start: 2024-01-16 | End: 2024-01-21

## 2024-01-16 ASSESSMENT — PATIENT HEALTH QUESTIONNAIRE - PHQ9
1. LITTLE INTEREST OR PLEASURE IN DOING THINGS: NOT AT ALL
2. FEELING DOWN, DEPRESSED OR HOPELESS: NOT AT ALL
SUM OF ALL RESPONSES TO PHQ9 QUESTIONS 1 AND 2: 0

## 2024-02-28 ENCOUNTER — OFFICE VISIT (OUTPATIENT)
Dept: PRIMARY CARE | Facility: CLINIC | Age: 79
End: 2024-02-28
Payer: COMMERCIAL

## 2024-02-28 VITALS
WEIGHT: 186.6 LBS | DIASTOLIC BLOOD PRESSURE: 73 MMHG | SYSTOLIC BLOOD PRESSURE: 112 MMHG | HEART RATE: 74 BPM | HEIGHT: 75 IN | TEMPERATURE: 97 F | BODY MASS INDEX: 23.2 KG/M2 | OXYGEN SATURATION: 97 %

## 2024-02-28 DIAGNOSIS — H65.05 RECURRENT ACUTE SEROUS OTITIS MEDIA OF LEFT EAR: ICD-10-CM

## 2024-02-28 DIAGNOSIS — J40 BRONCHITIS: ICD-10-CM

## 2024-02-28 DIAGNOSIS — J01.00 ACUTE NON-RECURRENT MAXILLARY SINUSITIS: Primary | ICD-10-CM

## 2024-02-28 PROCEDURE — 1126F AMNT PAIN NOTED NONE PRSNT: CPT | Performed by: PHYSICIAN ASSISTANT

## 2024-02-28 PROCEDURE — 1036F TOBACCO NON-USER: CPT | Performed by: PHYSICIAN ASSISTANT

## 2024-02-28 PROCEDURE — 1160F RVW MEDS BY RX/DR IN RCRD: CPT | Performed by: PHYSICIAN ASSISTANT

## 2024-02-28 PROCEDURE — 99214 OFFICE O/P EST MOD 30 MIN: CPT | Performed by: PHYSICIAN ASSISTANT

## 2024-02-28 PROCEDURE — 1159F MED LIST DOCD IN RCRD: CPT | Performed by: PHYSICIAN ASSISTANT

## 2024-02-28 RX ORDER — DOXYCYCLINE 100 MG/1
100 CAPSULE ORAL 2 TIMES DAILY
Qty: 20 CAPSULE | Refills: 0 | Status: SHIPPED | OUTPATIENT
Start: 2024-02-28 | End: 2024-03-09

## 2024-02-28 RX ORDER — PROMETHAZINE HYDROCHLORIDE AND DEXTROMETHORPHAN HYDROBROMIDE 6.25; 15 MG/5ML; MG/5ML
5 SYRUP ORAL 4 TIMES DAILY PRN
Qty: 120 ML | Refills: 0 | Status: SHIPPED | OUTPATIENT
Start: 2024-02-28 | End: 2024-03-29

## 2024-02-28 RX ORDER — PREDNISONE 20 MG/1
TABLET ORAL
Qty: 15 TABLET | Refills: 0 | Status: SHIPPED | OUTPATIENT
Start: 2024-02-28 | End: 2024-02-29 | Stop reason: SDUPTHER

## 2024-02-28 ASSESSMENT — ENCOUNTER SYMPTOMS
DEPRESSION: 0
LOSS OF SENSATION IN FEET: 0
OCCASIONAL FEELINGS OF UNSTEADINESS: 0

## 2024-02-28 ASSESSMENT — PATIENT HEALTH QUESTIONNAIRE - PHQ9
SUM OF ALL RESPONSES TO PHQ9 QUESTIONS 1 AND 2: 0
1. LITTLE INTEREST OR PLEASURE IN DOING THINGS: NOT AT ALL
2. FEELING DOWN, DEPRESSED OR HOPELESS: NOT AT ALL

## 2024-02-28 NOTE — PROGRESS NOTES
Subjective     HPI   Whitley Tom is a 78 y.o. year old male patient with presenting to clinic with concern for   Chief Complaint   Patient presents with    Sinusitis    Cough     Still cough /sinus leaving for vacation on Sat                         Treated with augmentin for sinusitis with improvement but he experienced bronchitis and was prescribed azithro over 1 month ago without resolution and now has sinus pressure and wheezing with chest congestion  Refuses levaquin- spouse had Hx TEN/duncan nuzhat's type reaction    Had cologuard 3 years ago wnl. 3/15/22. Declined colonoscopy    Labs were received from Dr. Shaikh's office as well as recent notes. Dr. Shaikh is retiring they are looking for new rheumatologist for infusions.    Previous visit:  Will be looking for a new rheumatologist. May need prolia orders, but labs are gotten at [facility] outside of the  system, not through the order set at  lab. I agreed to order next dose if he is not scheduled with new rheumatologist by that time.    Patient Active Problem List   Diagnosis    Allergic rhinitis    Chronic fatigue    Dyslipidemia    Osteopenia    Vitamin D deficiency    Otitis media with effusion, right    Disorder of left eustachian tube    Myringotomy tube status    Osteoporosis       Past Medical History:   Diagnosis Date    Abnormal EKG 07/17/2023    Accident on farm     right arm Auger accident    Bilateral impacted cerumen 07/17/2023    Chest injury     kicked in [facility] by a horse, observed at [facility]    Diarrhea, unspecified 11/03/2020    Diarrhea of presumed infectious origin    Fall 2013    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, small brain bleed and hemothorax, fully recovered    Pneumonia 07/17/2023    Postnasal discharge 07/17/2023      Past Surgical History:   Procedure Laterality Date    OTHER SURGICAL HISTORY Left 03/15/2021    Knee surgery      Family History   Problem Relation Name Age of Onset    Heart disease Mother      Hypertension Father      No  "Known Problems Other        Social History     Tobacco Use    Smoking status: Never    Smokeless tobacco: Never   Substance Use Topics    Alcohol use: Yes     Comment: rare/wine/beer        Current Outpatient Medications:     calcium carbonate (Tums) 200 mg calcium chewable tablet, Chew 1 tablet (500 mg) once daily. OTC For osteopenia calcium supplementation, Disp: , Rfl:     cholecalciferol (Vitamin D-3) 50 MCG (2000 UT) tablet, Take 1 tablet (50 mcg) by mouth once daily. OTC, Disp: , Rfl:     dexAMETHasone (Decadron) 0.1 % ophthalmic solution, Instill 4 drops into affected ear(s); twice a day; for 10 days, Disp: 5 mL, Rfl: 0    dexAMETHasone (Decadron) 0.1 % ophthalmic solution, Instill 3 drops into affected ear(s), once a week, for 5 months, Disp: 5 mL, Rfl: 0    ofloxacin (Floxin) 0.3 % otic solution, Instill 3 drops into affected ear(s), once a week, for 5 months, Disp: 5 mL, Rfl: 0    sod chloride-sodium bicarb (Ayr) nasal rinse, USE 2 APPLICATIONS BY NASAL ROUTE 2 TIMES A DAY AS DIRECTED, Disp: 50 each, Rfl: 0     Review of Systems  Constitutional: Denies fever  HEENT: Denies ST, earache  CVS: Denies Chest pain  Pulmonary: Denies wheezing, SOB  GI: Denies N/V  : Denies dysuria  Musculoskeletal:  Denies myalgia  Neuro: Denies focal weakness or numbness.  Skin: Denies Rashes.  *Review of Systems is negative unless otherwise mentioned in HPI or ROS above.    Objective   /73   Pulse 74   Temp 36.1 °C (97 °F)   Ht 1.905 m (6' 3\")   Wt 84.6 kg (186 lb 9.6 oz)   SpO2 97%   BMI 23.32 kg/m²  reviewed Body mass index is 23.32 kg/m².     Physical Exam  Constitutional: NAD.  Resting comfortably.  Head: Atraumatic, normocephalic.  ENT: Moist oral mucosa. Nasal mucosa wnl.   Cardiac: Regular rate & rhythm.   Pulmonary: Lungs clear bilat  GI: Soft, Nontender, nondistended.   Musculoskeletal: No peripheral edema.   Skin: No evidence of trauma. No rashes  Psych: Intact judgement and insight.    .Assessment/Plan "   Problem List Items Addressed This Visit    None  Visit Diagnoses         Codes    Acute non-recurrent maxillary sinusitis    -  Primary J01.00    Relevant Medications    doxycycline (Vibramycin) 100 mg capsule    promethazine-DM (Phenergan-DM) 6.25-15 mg/5 mL syrup    Recurrent acute serous otitis media of left ear     H65.05    Relevant Medications    predniSONE (Deltasone) 20 mg tablet    Bronchitis     J40    Relevant Medications    doxycycline (Vibramycin) 100 mg capsule    predniSONE (Deltasone) 20 mg tablet    promethazine-DM (Phenergan-DM) 6.25-15 mg/5 mL syrup

## 2024-02-29 DIAGNOSIS — H65.05 RECURRENT ACUTE SEROUS OTITIS MEDIA OF LEFT EAR: ICD-10-CM

## 2024-02-29 DIAGNOSIS — J40 BRONCHITIS: ICD-10-CM

## 2024-02-29 NOTE — TELEPHONE ENCOUNTER
Wife called and stated that the pharmacy said that they did not have the prednisone. Please resend.    Also, they are leaving on vacation on Saturday. She did say that his nebulizer stopped working. They are asking for script to be sent to [facility] in [place] for the nebulizer.     Prednisone goes to [facility] in [place].

## 2024-03-01 ENCOUNTER — TELEPHONE (OUTPATIENT)
Dept: PRIMARY CARE | Facility: CLINIC | Age: 79
End: 2024-03-01
Payer: COMMERCIAL

## 2024-03-01 DIAGNOSIS — J45.998 CHRONIC ALLERGIC BRONCHITIS (HHS-HCC): Primary | ICD-10-CM

## 2024-03-01 RX ORDER — NEBULIZER AND COMPRESSOR
1 EACH MISCELLANEOUS 4 TIMES DAILY
Qty: 90 EACH | Refills: 0 | Status: SHIPPED | OUTPATIENT
Start: 2024-03-01 | End: 2025-03-01

## 2024-03-01 RX ORDER — PREDNISONE 20 MG/1
TABLET ORAL
Qty: 15 TABLET | Refills: 0 | Status: SHIPPED | OUTPATIENT
Start: 2024-03-01 | End: 2024-05-28 | Stop reason: WASHOUT

## 2024-03-01 RX ORDER — PEN NEEDLE, DIABETIC 31 GX5/16"
1 NEEDLE, DISPOSABLE MISCELLANEOUS 4 TIMES DAILY PRN
Qty: 1 EACH | Refills: 0 | Status: SHIPPED | OUTPATIENT
Start: 2024-03-01 | End: 2025-03-01

## 2024-03-01 NOTE — TELEPHONE ENCOUNTER
Pharmacy called, Spoke to pharmacist. Rx needed to specify compressor for home reusable nebulizer machine. Rx resent.

## 2024-03-19 ENCOUNTER — TELEPHONE (OUTPATIENT)
Dept: PRIMARY CARE | Facility: CLINIC | Age: 79
End: 2024-03-19
Payer: COMMERCIAL

## 2024-03-19 NOTE — TELEPHONE ENCOUNTER
Patient wife called and stated that Whitley is still coughing. Would like to have an X-Ray ordered. Last visit was on 02/28/2024. Please advise.

## 2024-03-25 ENCOUNTER — TELEPHONE (OUTPATIENT)
Dept: PRIMARY CARE | Facility: CLINIC | Age: 79
End: 2024-03-25
Payer: COMMERCIAL

## 2024-03-25 DIAGNOSIS — R05.3 PERSISTENT COUGH FOR 3 WEEKS OR LONGER: Primary | ICD-10-CM

## 2024-03-25 RX ORDER — BENZONATATE 100 MG/1
100 CAPSULE ORAL 3 TIMES DAILY PRN
Qty: 42 CAPSULE | Refills: 0 | Status: SHIPPED | OUTPATIENT
Start: 2024-03-25 | End: 2024-04-24

## 2024-03-26 ENCOUNTER — OFFICE VISIT (OUTPATIENT)
Dept: PRIMARY CARE | Facility: CLINIC | Age: 79
End: 2024-03-26
Payer: COMMERCIAL

## 2024-03-26 ENCOUNTER — HOSPITAL ENCOUNTER (OUTPATIENT)
Dept: RADIOLOGY | Facility: HOSPITAL | Age: 79
Discharge: HOME | End: 2024-03-26
Payer: COMMERCIAL

## 2024-03-26 VITALS
BODY MASS INDEX: 22.68 KG/M2 | HEIGHT: 75 IN | WEIGHT: 182.4 LBS | TEMPERATURE: 97 F | HEART RATE: 92 BPM | SYSTOLIC BLOOD PRESSURE: 136 MMHG | DIASTOLIC BLOOD PRESSURE: 86 MMHG | OXYGEN SATURATION: 94 %

## 2024-03-26 DIAGNOSIS — J40 BRONCHITIS: ICD-10-CM

## 2024-03-26 DIAGNOSIS — J01.01 ACUTE RECURRENT MAXILLARY SINUSITIS: Primary | ICD-10-CM

## 2024-03-26 PROCEDURE — 1159F MED LIST DOCD IN RCRD: CPT | Performed by: PHYSICIAN ASSISTANT

## 2024-03-26 PROCEDURE — 71046 X-RAY EXAM CHEST 2 VIEWS: CPT | Performed by: RADIOLOGY

## 2024-03-26 PROCEDURE — 99214 OFFICE O/P EST MOD 30 MIN: CPT | Performed by: PHYSICIAN ASSISTANT

## 2024-03-26 PROCEDURE — 71046 X-RAY EXAM CHEST 2 VIEWS: CPT

## 2024-03-26 PROCEDURE — 1160F RVW MEDS BY RX/DR IN RCRD: CPT | Performed by: PHYSICIAN ASSISTANT

## 2024-03-26 PROCEDURE — 1036F TOBACCO NON-USER: CPT | Performed by: PHYSICIAN ASSISTANT

## 2024-03-26 RX ORDER — LEVOFLOXACIN 500 MG/1
500 TABLET, FILM COATED ORAL DAILY
Qty: 10 TABLET | Refills: 0 | Status: SHIPPED | OUTPATIENT
Start: 2024-03-26 | End: 2024-04-05

## 2024-03-26 ASSESSMENT — ENCOUNTER SYMPTOMS
DEPRESSION: 0
OCCASIONAL FEELINGS OF UNSTEADINESS: 0
LOSS OF SENSATION IN FEET: 0

## 2024-03-26 ASSESSMENT — PATIENT HEALTH QUESTIONNAIRE - PHQ9
SUM OF ALL RESPONSES TO PHQ9 QUESTIONS 1 AND 2: 0
2. FEELING DOWN, DEPRESSED OR HOPELESS: NOT AT ALL
1. LITTLE INTEREST OR PLEASURE IN DOING THINGS: NOT AT ALL

## 2024-03-26 NOTE — PROGRESS NOTES
Subjective     HPI   Whitley Tom is a 78 y.o. year old male patient with presenting to clinic with concern for   Chief Complaint   Patient presents with    Cough     Still coughing would like an x-ray    Sinusitis     Left ear feels like it is draining down his throat       Persistent cough- sinusitis 1/2/24, bronchitis 1/16, sinusitis & bronchitis 2/28  Has seen Dr. Gil- had sinus surgery July 2023.    Feeling more sinus pressure, yellow post nasal drainage, L earache persistent pressure and discomfort. Persistent cough.     Patient Active Problem List   Diagnosis    Allergic rhinitis    Chronic fatigue    Dyslipidemia    Osteopenia    Vitamin D deficiency    Otitis media with effusion, right    Disorder of left eustachian tube    Myringotomy tube status    Osteoporosis    Chronic allergic bronchitis       Past Medical History:   Diagnosis Date    Abnormal EKG 07/17/2023    Accident on farm     right arm Auger accident    Bilateral impacted cerumen 07/17/2023    Chest injury     kicked in midchest by a horse, observed at [facility]    Diarrhea, unspecified 11/03/2020    Diarrhea of presumed infectious origin    Fall 2011    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, TBI- SubArachnoid hemorrhage, hemothorax,  R temporal bone fx , L1 fx    Pneumonia 07/17/2023    Postnasal discharge 07/17/2023      Past Surgical History:   Procedure Laterality Date    OTHER SURGICAL HISTORY Left 03/15/2021    Knee surgery      Family History   Problem Relation Name Age of Onset    Heart disease Mother      Hypertension Father      No Known Problems Other        Social History     Tobacco Use    Smoking status: Never    Smokeless tobacco: Never   Substance Use Topics    Alcohol use: Yes     Comment: rare/wine/beer        Current Outpatient Medications:     benzonatate (Tessalon) 100 mg capsule, Take 1 capsule (100 mg) by mouth 3 times a day as needed for cough. Do not crush or chew., Disp: 42 capsule, Rfl: 0    calcium carbonate (Tums) 200 mg calcium  "chewable tablet, Chew 1 tablet (500 mg) once daily. OTC For osteopenia calcium supplementation, Disp: , Rfl:     cholecalciferol (Vitamin D-3) 50 MCG (2000 UT) tablet, Take 1 tablet (50 mcg) by mouth once daily. OTC, Disp: , Rfl:     dexAMETHasone (Decadron) 0.1 % ophthalmic solution, Instill 3 drops into affected ear(s), once a week, for 5 months, Disp: 5 mL, Rfl: 0    nebulizer and compressor (EasyAir Compressor Nebulizer) device, 1 Units 4 times a day., Disp: 90 each, Rfl: 0    nebulizers misc, 1 Device 4 times a day as needed (use nebulizer with solution 4 times daily for wheezing or shortness of breath)., Disp: 1 each, Rfl: 0    ofloxacin (Floxin) 0.3 % otic solution, Instill 3 drops into affected ear(s), once a week, for 5 months, Disp: 5 mL, Rfl: 0    predniSONE (Deltasone) 20 mg tablet, Take 3 tabs (60mg) daily for 3 days, then take 2 tabs (40mg) daily for 2 days, then take 1 tab (20mg) daily for 2 days., Disp: 15 tablet, Rfl: 0    promethazine-DM (Phenergan-DM) 6.25-15 mg/5 mL syrup, Take 5 mL by mouth 4 times a day as needed for cough., Disp: 120 mL, Rfl: 0    sod chloride-sodium bicarb (Ayr) nasal rinse, USE 2 APPLICATIONS BY NASAL ROUTE 2 TIMES A DAY AS DIRECTED, Disp: 50 each, Rfl: 0    levoFLOXacin (Levaquin) 500 mg tablet, Take 1 tablet (500 mg) by mouth once daily for 10 days., Disp: 10 tablet, Rfl: 0     Review of Systems  Constitutional: Denies fever  HEENT: Denies ST, earache  CVS: Denies Chest pain  Pulmonary: Denies wheezing, SOB  GI: Denies N/V  : Denies dysuria  Musculoskeletal:  Denies myalgia  Neuro: Denies focal weakness or numbness.  Skin: Denies Rashes.  *Review of Systems is negative unless otherwise mentioned in HPI or ROS above.    Objective   /86   Pulse 92   Temp 36.1 °C (97 °F)   Ht 1.905 m (6' 3\")   Wt 82.7 kg (182 lb 6.4 oz)   SpO2 94%   BMI 22.80 kg/m²  reviewed Body mass index is 22.8 kg/m².     Physical Exam  General: Vitals noted, no distress. Afebrile. Resting " comfortably  EENT:  Moist oral mucosa. Posterior oropharynx erythematous with yellow posterior pharyngeal streaking.  Nasal mucosa erythematous . Tenderness over maxillary sinuses. R TM has myringotomy tube present. L TM buldging, nonerythematous.    Cardiac: Regular rate & rhythm. No murmur.   Pulmonary: Lungs clear bilaterally with good aeration. Mild end expiratory wheeze at left base. No rhonchi, or rales.  Abdomen: Soft. Nontender, Nondistended. Normal bowel sounds x4.  Extremities: No peripheral edema.  Neck is supple.   Skin: No rash or evidence of trauma.  Neuro: No focal neurologic deficits.    .Assessment/Plan   Problem List Items Addressed This Visit    None  Visit Diagnoses         Codes    Acute recurrent maxillary sinusitis    -  Primary J01.01    Relevant Medications    levoFLOXacin (Levaquin) 500 mg tablet    Bronchitis     J40    Relevant Orders    XR chest 2 views          Call Dr. Gil for follow up.

## 2024-03-26 NOTE — PATIENT INSTRUCTIONS
Probiotic foods are better than probiotic pills. Probiotics help your gut populate a healthy balance of good bacteria to keep harmful bacteria/inflammation and diarrhea in check. Probiotic foods are best eaten an hour or more AFTER you take an antibiotic dose.  We want diverse microflora.   I'd recommend kombucha (but not for kids),  kimchi, alyssa (kind of like sour butter milk taste), activia or live culture yogurts, sour dough breads, sourkraut, cheeses (elsiha gouda cheddar & swiss), pickles (in moderation- lots of sodium!), Miso soup, apples, green peas (frozen vs canned preferably).    Prebiotics aren't actual bacteria- they're insoluble fiber and non-digestable food ingredients that are good for your gut to support the good bacteria like probiotics and keep your gut healthy. Things like bananas, oats, asparagus, garlic, and soybeans can be good for your overall gut health.     These are the kind of things I'd recommend trying to do. The nutrition that our bodies need is best absorbed from foods we eat vs pills and supplements. If you need extra support, look for probiotics including lactobacillus and bifidobacterium.

## 2024-04-01 ENCOUNTER — CLINICAL SUPPORT (OUTPATIENT)
Dept: AUDIOLOGY | Facility: CLINIC | Age: 79
End: 2024-04-01
Payer: COMMERCIAL

## 2024-04-01 ENCOUNTER — OFFICE VISIT (OUTPATIENT)
Dept: OTOLARYNGOLOGY | Facility: CLINIC | Age: 79
End: 2024-04-01
Payer: COMMERCIAL

## 2024-04-01 DIAGNOSIS — R05.2 SUBACUTE COUGH: ICD-10-CM

## 2024-04-01 DIAGNOSIS — H69.92 DISORDER OF LEFT EUSTACHIAN TUBE: Primary | ICD-10-CM

## 2024-04-01 DIAGNOSIS — H69.91 DISORDER OF RIGHT EUSTACHIAN TUBE: ICD-10-CM

## 2024-04-01 DIAGNOSIS — R94.120 NEGATIVE MIDDLE EAR PRESSURE OF BOTH EARS WITH TYPE C TYMPANOGRAM CURVE: Primary | ICD-10-CM

## 2024-04-01 PROCEDURE — 92567 TYMPANOMETRY: CPT | Performed by: AUDIOLOGIST

## 2024-04-01 PROCEDURE — 99214 OFFICE O/P EST MOD 30 MIN: CPT | Performed by: OTOLARYNGOLOGY

## 2024-04-01 PROCEDURE — 69420 INCISION OF EARDRUM: CPT | Performed by: OTOLARYNGOLOGY

## 2024-04-01 PROCEDURE — 1159F MED LIST DOCD IN RCRD: CPT | Performed by: OTOLARYNGOLOGY

## 2024-04-01 PROCEDURE — 1160F RVW MEDS BY RX/DR IN RCRD: CPT | Performed by: OTOLARYNGOLOGY

## 2024-04-01 ASSESSMENT — PAIN - FUNCTIONAL ASSESSMENT: PAIN_FUNCTIONAL_ASSESSMENT: 0-10

## 2024-04-01 ASSESSMENT — PAIN SCALES - GENERAL: PAINLEVEL_OUTOF10: 0 - NO PAIN

## 2024-04-01 NOTE — PROGRESS NOTES
Tympanometry:  Right ear - Type C, negative middle ear pressure with normal ear canal volume and normal eardrum mobility  Left ear - Type C, negative middle ear pressure with normal ear canal volume and normal eardrum mobility

## 2024-04-01 NOTE — LETTER
April 1, 2024     Gloria Song PA-C  [address] [address]    Patient: Whitley Tom   YOB: 1945   Date of Visit: 4/1/2024       Dear Gloria Song PA-C:    Thank you for referring Whitley Tom to me for evaluation. Below are my notes for this consultation.  If you have questions, please do not hesitate to call me. I look forward to following your patient along with you.      Sincerely,     Jesus Gil MD      CC: No Recipients  ______________________________________________________________________________________      Subjective  Patient ID: Whitley Tom is a 78 y.o. male who presents for follow up for ear and nose-sinus issues.    HPI    04.01.2024: He feels like there  is fluid in his left ear, he had septoplasty, and sinus surgery on 07.06.2023. He had a uri and has used 4 rounds of oral antibiotics.    On today's examination tube in right TM came out. Right TM looks adhesive retracted posteriorly. Left TM looks intact. I have not seen fluid on today's exam. He has bilateral type c tympanograms -196 dapa at left and -236 dapa at right.     Nasal endoscopy did not show any purulent discharge in nose. Nasal passages look open bilaterally.     He has dry cough going on for a while. There is a cough triggering point at subglottic - suprasternal area. Local steroid injection was done.    Plan:  1- follow up in May 2024, sooner if needed  _________________________________________________________________    11.27.2023: He had his hearing test today.  At left ear there is sloping type of hearing loss at high frequencies and mild slope at right ear at high and at low frequencies.  He feels like his hearing at left is not good good as his hearing at right.  Since most of his problem is sensorineural, I recommend to consider using a hearing aid for the left ear.  On examination, ventilation tube seems to be touching right middle ear mucosa, which may cause chronic inflammation  over time.    Recommendations:  1-use eardrops once a week to suppress foreign body reaction at right ear  2-consider using a hearing aid for the left ear    _________________________________________________________________    10/13/2023: He comes for follow up. Nose and sinuses feel good. On examination nasal passages look open bilaterally. Tube in right TM, looks open. No granulation or discharge. Pinpoint opening at left TM posterosuperiorly.     Plan:  1- hearing test  2- follow up in 6 months    _________________________________________________________________    10/10/2023: No nose and sinus problems essentially. Left ear feels full. He recently had a slight cold.     On examination, right TM is severely retracted and there is fluid in right middle ear. Left TM is intact.    Dx:  1- right otitis media with effusion  2- retraction of right TM  3- left ETD    Today he had insertion of right ventilation tube and left needle myringotomy with intratympanic steroid injection.    Plan:  1- ear drops  2- follow up in 2 weeks    _________________________________________________________________      08.22.2023: He had surgery on 07.06.2023.     Diagnosis:   1- deviated nasal septum,   2- michael bullosa (left),   3- chronic postnasal discharge,   4- bilateral maxillary sinusitis,   5- congestion of left inferior turbinate   6- retraction-adhesion of right tympanic membrane   7- right otitis media with effusion      Procedure:   1. septoplasty   2. reduction of left michael bullosa   3. radiofrequency reduction of inferior turbinates   4. partial ethmoidectomy   5. maxillary antrostomy   6. clarifix cryotherapy   7. right myringotomy      Findings: septum deviated to left anteriorly, deviated to right posteriorly, left michael bullosa, bilateral marcos cells, right maxillary antrum was closed. Retraction-adhesion of right TM, right OME.     On examination, there was crusting over the left inferior turbinate. Cleaning was  done. Prominent left nasal septal swell body (+). There was mucopurulent discharge coming out of right middle meatus. Synechia in middle meatus bilaterally. Patient did not use the ointment and nasal rinse as instructed.     Recommendation:  1â€“Daily nasal rinse and antibiotic ointment  2â€“follow-up in 2 months, consider lyses of synechia, if symptomatic.           ____________________________________________________________________________________________        Mr. Tom is a 78 yo M. He comes for nose sinus issues and ear wax.  He has lots of drainage from his sinuses most of the time, mostly postnasal.   It tickles his throat and causes him to cough. This has been going on for decades.  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     History of ear tubes, years ago.  Difficulty with hearing, ears feel full.     On examination, there was wax in ears bilaterally. Cleaning was done. RIght TM is retracted and there is fluid (+). LEft TM looks essentially normal.      Nasal septum is deviated to left anteriorly and to right superiorly and posteriorly  No visible purulent postnasal discharge.      Dx:  1- Deviated nasal septum  2- RIght otitis media with effusion  3- wax build up bilateral (cleaned)     Plan:  1- septoplasty, RF turbinate reduction  2- insertion of right t -tube  3- hearing test  4- clarifix cryotherapy for postnasal thick mucoid discharge   _________________________________________________________________    04.30.2023: CT scan shows deviated nasal septum to left anteriorly and severely deviated nasal septum to right posteriorly. Congestion of right inferior turbinate, left michael bullosa, bilateral mucosal thickenings at maxillary sinuses.     Modified plan is septoplasty, reduction of left michael bullosa, RF inferior turbinate reduction, clarifix cryotherapy for postnasal discharge, limited sinus surgery [id number], [id number].      Chief  "Complaint     \"Suggested by Primary (ear wax) sinus\"      Adult Risk ScreeningAdult Risk Screening_UH: There are no spiritual/cultural practices/values/needs that are important to know   Initial Fall Risk Screening:   ZAHRA has not fallen in the last 6 months. His fall did not result in injury. ZAHRA does not have a fear of falling. He does not need assistance with sitting, standing or walking. Does not need assistance walking in his home. He does not need assistance in an unfamiliar setting. The patient is not using an assistive device.        Domestic Violence Screen: Does not feel threatened or abused physically, emotionally or sexually. Do you feel UNSAFE?   The patient feels safe in the home.   Depression/Suicide Screening:   During the past 2 weeks, the patient has not felt down, depressed or hopeless.    During the past 2 weeks, the patient has not felt little interest or pleasure in doing things.       Review of Systems  History of rar fullness (+)    Objective   Physical Exam  (old exam note)     General appearance: Healthy-appearing, well-nourished, well groomed, in no acute distress.      Head and Face: Atraumatic with no masses, lesions, or scarring.      Salivary glands: No tenderness of the parotid glands or parotid masses.      No tenderness of the submandibular glands or submandibular masses.      Facial strength: Normal strength and symmetry, no synkinesis or facial tic.      Eyes: Conjunctivas look non-hyperemic bilaterally     Ears: Bilaterally ear canals look normal. Tympanic membranes intact, no hyperemia, fluid or retraction.      Nose: Mucosa looks normal. No purulent discharge. Septum deviated to left / right / essentially straight.     Oral Cavity/Mouth: Lips and tongue look normal.      Throat: No postnasal discharge. No tonsil hypertrophy. No hyperemia.     Neck: Symmetrical, trachea midline.      Pulmonary: Normal respiratory effort.      Lymphatic: No palpable pathologic lymph nodes at " neck.      Neurological/Psychiatric: Orientation to person, place, and time: Normal.   Mood and affect: Normal.      Extremities: No clubbing.        Procedure    BILATERAL NEEDLE MYRINGOTOMY and INTRATYMPANIC STEROID INJECTION 04.01.2024    Operative microscope was brought to patient's left ear. Topical phenol was applied posteriorly, then myringotomy was done using 25 g needle and ~0.4 ml 10mg/ml dexamethasone was injected intratympanically. Antibiotic drops were applied.    Operative microscope was brought to patient's right ear. Topical phenol was applied posteriorly, then myringotomy was done using 27 g needle and ~0.2 ml 10mg/ml dexamethasone was injected intratympanically. Antibiotic drops were applied.    _________________________________________________________________    INJECTION NOTE 04.01.2024  ~10 mg dexamethasone mixed with local anesthetics was injected to suprasternal, subglottic area towards the external branches of superior laryngeal nerve for subacute dry cough.    _________________________________________________________________    LEFT NEEDLE MYRINGOTOMY and INTRATYMPANIC STEROID INJECTION 10/10/2023  Operative microscope was brought to patient's left ear. Topical phenol was applied posteriorly, then myringotomy was done using 25 g needle and ~0.4 ml 10mg/ml dexamethasone was injected intratympanically. Antibiotic drops were applied.    RIGHT VENTILATION TUBE INSERTION 10/10/2023  Operative microscope was brought to patient's right ear. Topical phenol was applied anteroinferiorly, then myringotomy was done using 21 g needle. Fluid was suctioned. Brad bobbin ventilation tube was inserted. Antibiotic drops were applied.    Procedure was concluded.       Assessment/Plan    04.01.2024: He feels like there  is fluid in his left ear, he had septoplasty, and sinus surgery on 07.06.2023. He had a uri and has used 4 rounds of oral antibiotics.    On today's examination tube in right TM came out. Right  TM looks adhesive retracted posteriorly. Left TM looks intact. I have not seen fluid on today's exam. He has bilateral type c tympanograms -196 dapa at left and -236 dapa at right.     Nasal endoscopy did not show any purulent discharge in nose. Nasal passages look open bilaterally.     He has dry cough going on for a while. There is a cough triggering point at subglottic - suprasternal area. Local steroid injection was done.    Plan:  1- follow up in May 2024, sooner if needed  _________________________________________________________________    11.27.2023: He had his hearing test today.  At left ear there is sloping type of hearing loss at high frequencies and mild slope at right ear at high and at low frequencies.  He feels like his hearing at left is not good good as his hearing at right.  Since most of his problem is sensorineural, I recommend to consider using a hearing aid for the left ear.  On examination, ventilation tube seems to be touching right middle ear mucosa, which may cause chronic inflammation over time.    Recommendations:  1-use eardrops once a week to suppress foreign body reaction at right ear  2-consider using a hearing aid for the left ear    _________________________________________________________________    10/13/2023: He comes for follow up. Nose and sinuses feel good. On examination nasal passages look open bilaterally. Tube in right TM, looks open. No granulation or discharge. Pinpoint opening at left TM posterosuperiorly.     Plan:  1- hearing test  2- follow up in 6 months    _________________________________________________________________    10/10/2023: No nose and sinus problems essentially. Left ear feels full. He recently had a slight cold.     On examination, right TM is severely retracted and there is fluid in right middle ear. Left TM is intact.    Dx:  1- right otitis media with effusion  2- retraction of right TM  3- left ETD    Today he had insertion of right ventilation  tube and left needle myringotomy with intratympanic steroid injection.    Plan:  1- ear drops  2- follow up in 2 weeks

## 2024-04-01 NOTE — PROGRESS NOTES
Subjective   Patient ID: Whitley Tom is a 78 y.o. male who presents for follow up for ear and nose-sinus issues.    HPI    04.01.2024: He feels like there  is fluid in his left ear, he had septoplasty, and sinus surgery on 07.06.2023. He had a uri and has used 4 rounds of oral antibiotics.    On today's examination tube in right TM came out. Right TM looks adhesive retracted posteriorly. Left TM looks intact. I have not seen fluid on today's exam. He has bilateral type c tympanograms -196 dapa at left and -236 dapa at right.     Nasal endoscopy did not show any purulent discharge in nose. Nasal passages look open bilaterally.     He has dry cough going on for a while. There is a cough triggering point at subglottic - suprasternal area. Local steroid injection was done.    Plan:  1- follow up in May 2024, sooner if needed  _________________________________________________________________    11.27.2023: He had his hearing test today.  At left ear there is sloping type of hearing loss at high frequencies and mild slope at right ear at high and at low frequencies.  He feels like his hearing at left is not good good as his hearing at right.  Since most of his problem is sensorineural, I recommend to consider using a hearing aid for the left ear.  On examination, ventilation tube seems to be touching right middle ear mucosa, which may cause chronic inflammation over time.    Recommendations:  1-use eardrops once a week to suppress foreign body reaction at right ear  2-consider using a hearing aid for the left ear    _________________________________________________________________    10/13/2023: He comes for follow up. Nose and sinuses feel good. On examination nasal passages look open bilaterally. Tube in right TM, looks open. No granulation or discharge. Pinpoint opening at left TM posterosuperiorly.     Plan:  1- hearing test  2- follow up in 6  months    _________________________________________________________________    10/10/2023: No nose and sinus problems essentially. Left ear feels full. He recently had a slight cold.     On examination, right TM is severely retracted and there is fluid in right middle ear. Left TM is intact.    Dx:  1- right otitis media with effusion  2- retraction of right TM  3- left ETD    Today he had insertion of right ventilation tube and left needle myringotomy with intratympanic steroid injection.    Plan:  1- ear drops  2- follow up in 2 weeks    _________________________________________________________________      08.22.2023: He had surgery on 07.06.2023.     Diagnosis:   1- deviated nasal septum,   2- michael bullosa (left),   3- chronic postnasal discharge,   4- bilateral maxillary sinusitis,   5- congestion of left inferior turbinate   6- retraction-adhesion of right tympanic membrane   7- right otitis media with effusion      Procedure:   1. septoplasty   2. reduction of left michael bullosa   3. radiofrequency reduction of inferior turbinates   4. partial ethmoidectomy   5. maxillary antrostomy   6. clarifix cryotherapy   7. right myringotomy      Findings: septum deviated to left anteriorly, deviated to right posteriorly, left michael bullosa, bilateral marcos cells, right maxillary antrum was closed. Retraction-adhesion of right TM, right OME.     On examination, there was crusting over the left inferior turbinate. Cleaning was done. Prominent left nasal septal swell body (+). There was mucopurulent discharge coming out of right middle meatus. Synechia in middle meatus bilaterally. Patient did not use the ointment and nasal rinse as instructed.     Recommendation:  1â€“Daily nasal rinse and antibiotic ointment  2â€“follow-up in 2 months, consider lyses of synechia, if symptomatic.           ____________________________________________________________________________________________        Mr. Tom is a 76 yo KAYLYN Mendoza  "comes for nose sinus issues and ear wax.  He has lots of drainage from his sinuses most of the time, mostly postnasal.   It tickles his throat and causes him to cough. This has been going on for decades.  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     History of ear tubes, years ago.  Difficulty with hearing, ears feel full.     On examination, there was wax in ears bilaterally. Cleaning was done. RIght TM is retracted and there is fluid (+). LEft TM looks essentially normal.      Nasal septum is deviated to left anteriorly and to right superiorly and posteriorly  No visible purulent postnasal discharge.      Dx:  1- Deviated nasal septum  2- RIght otitis media with effusion  3- wax build up bilateral (cleaned)     Plan:  1- septoplasty, RF turbinate reduction  2- insertion of right t -tube  3- hearing test  4- clarifix cryotherapy for postnasal thick mucoid discharge   _________________________________________________________________    04.30.2023: CT scan shows deviated nasal septum to left anteriorly and severely deviated nasal septum to right posteriorly. Congestion of right inferior turbinate, left michael bullosa, bilateral mucosal thickenings at maxillary sinuses.     Modified plan is septoplasty, reduction of left michael bullosa, RF inferior turbinate reduction, clarifix cryotherapy for postnasal discharge, limited sinus surgery 31500, [id number].      Chief Complaint     \"Suggested by Primary (ear wax) sinus\"      Adult Risk ScreeningAdult Risk Screening_UH: There are no spiritual/cultural practices/values/needs that are important to know   Initial Fall Risk Screening:   ZAHRA has not fallen in the last 6 months. His fall did not result in injury. ZAHRA does not have a fear of falling. He does not need assistance with sitting, standing or walking. Does not need assistance walking in his home. He does not need assistance in an unfamiliar setting. The " patient is not using an assistive device.        Domestic Violence Screen: Does not feel threatened or abused physically, emotionally or sexually. Do you feel UNSAFE?   The patient feels safe in the home.   Depression/Suicide Screening:   During the past 2 weeks, the patient has not felt down, depressed or hopeless.    During the past 2 weeks, the patient has not felt little interest or pleasure in doing things.       Review of Systems  History of rar fullness (+)    Objective   Physical Exam  (old exam note)     General appearance: Healthy-appearing, well-nourished, well groomed, in no acute distress.      Head and Face: Atraumatic with no masses, lesions, or scarring.      Salivary glands: No tenderness of the parotid glands or parotid masses.      No tenderness of the submandibular glands or submandibular masses.      Facial strength: Normal strength and symmetry, no synkinesis or facial tic.      Eyes: Conjunctivas look non-hyperemic bilaterally     Ears: Bilaterally ear canals look normal. Tympanic membranes intact, no hyperemia, fluid or retraction.      Nose: Mucosa looks normal. No purulent discharge. Septum deviated to left / right / essentially straight.     Oral Cavity/Mouth: Lips and tongue look normal.      Throat: No postnasal discharge. No tonsil hypertrophy. No hyperemia.     Neck: Symmetrical, trachea midline.      Pulmonary: Normal respiratory effort.      Lymphatic: No palpable pathologic lymph nodes at neck.      Neurological/Psychiatric: Orientation to person, place, and time: Normal.   Mood and affect: Normal.      Extremities: No clubbing.        Procedure    BILATERAL NEEDLE MYRINGOTOMY and INTRATYMPANIC STEROID INJECTION 04.01.2024    Operative microscope was brought to patient's left ear. Topical phenol was applied posteriorly, then myringotomy was done using 25 g needle and ~0.4 ml 10mg/ml dexamethasone was injected intratympanically. Antibiotic drops were applied.    Operative microscope  was brought to patient's right ear. Topical phenol was applied posteriorly, then myringotomy was done using 27 g needle and ~0.2 ml 10mg/ml dexamethasone was injected intratympanically. Antibiotic drops were applied.    _________________________________________________________________    INJECTION NOTE 04.01.2024  ~10 mg dexamethasone mixed with local anesthetics was injected to suprasternal, subglottic area towards the external branches of superior laryngeal nerve for subacute dry cough.    _________________________________________________________________    LEFT NEEDLE MYRINGOTOMY and INTRATYMPANIC STEROID INJECTION 10/10/2023  Operative microscope was brought to patient's left ear. Topical phenol was applied posteriorly, then myringotomy was done using 25 g needle and ~0.4 ml 10mg/ml dexamethasone was injected intratympanically. Antibiotic drops were applied.    RIGHT VENTILATION TUBE INSERTION 10/10/2023  Operative microscope was brought to patient's right ear. Topical phenol was applied anteroinferiorly, then myringotomy was done using 21 g needle. Fluid was suctioned. Brad bobbin ventilation tube was inserted. Antibiotic drops were applied.    Procedure was concluded.       Assessment/Plan     04.01.2024: He feels like there  is fluid in his left ear, he had septoplasty, and sinus surgery on 07.06.2023. He had a uri and has used 4 rounds of oral antibiotics.    On today's examination tube in right TM came out. Right TM looks adhesive retracted posteriorly. Left TM looks intact. I have not seen fluid on today's exam. He has bilateral type c tympanograms -196 dapa at left and -236 dapa at right.     Nasal endoscopy did not show any purulent discharge in nose. Nasal passages look open bilaterally.     He has dry cough going on for a while. There is a cough triggering point at subglottic - suprasternal area. Local steroid injection was done.    Plan:  1- follow up in May 2024, sooner if  needed  _________________________________________________________________    11.27.2023: He had his hearing test today.  At left ear there is sloping type of hearing loss at high frequencies and mild slope at right ear at high and at low frequencies.  He feels like his hearing at left is not good good as his hearing at right.  Since most of his problem is sensorineural, I recommend to consider using a hearing aid for the left ear.  On examination, ventilation tube seems to be touching right middle ear mucosa, which may cause chronic inflammation over time.    Recommendations:  1-use eardrops once a week to suppress foreign body reaction at right ear  2-consider using a hearing aid for the left ear    _________________________________________________________________    10/13/2023: He comes for follow up. Nose and sinuses feel good. On examination nasal passages look open bilaterally. Tube in right TM, looks open. No granulation or discharge. Pinpoint opening at left TM posterosuperiorly.     Plan:  1- hearing test  2- follow up in 6 months    _________________________________________________________________    10/10/2023: No nose and sinus problems essentially. Left ear feels full. He recently had a slight cold.     On examination, right TM is severely retracted and there is fluid in right middle ear. Left TM is intact.    Dx:  1- right otitis media with effusion  2- retraction of right TM  3- left ETD    Today he had insertion of right ventilation tube and left needle myringotomy with intratympanic steroid injection.    Plan:  1- ear drops  2- follow up in 2 weeks

## 2024-04-23 ENCOUNTER — APPOINTMENT (OUTPATIENT)
Dept: RHEUMATOLOGY | Facility: CLINIC | Age: 79
End: 2024-04-23
Payer: COMMERCIAL

## 2024-04-30 ENCOUNTER — OFFICE VISIT (OUTPATIENT)
Dept: RHEUMATOLOGY | Facility: CLINIC | Age: 79
End: 2024-04-30
Payer: COMMERCIAL

## 2024-04-30 VITALS
DIASTOLIC BLOOD PRESSURE: 69 MMHG | HEART RATE: 72 BPM | WEIGHT: 183 LBS | HEIGHT: 75 IN | BODY MASS INDEX: 22.75 KG/M2 | SYSTOLIC BLOOD PRESSURE: 146 MMHG | TEMPERATURE: 98.4 F

## 2024-04-30 DIAGNOSIS — M81.0 AGE-RELATED OSTEOPOROSIS WITHOUT CURRENT PATHOLOGICAL FRACTURE: ICD-10-CM

## 2024-04-30 DIAGNOSIS — M85.852 OSTEOPENIA OF NECK OF LEFT FEMUR: Primary | ICD-10-CM

## 2024-04-30 PROCEDURE — 99205 OFFICE O/P NEW HI 60 MIN: CPT | Performed by: INTERNAL MEDICINE

## 2024-04-30 PROCEDURE — 99215 OFFICE O/P EST HI 40 MIN: CPT | Mod: GC | Performed by: INTERNAL MEDICINE

## 2024-04-30 PROCEDURE — 1159F MED LIST DOCD IN RCRD: CPT | Performed by: INTERNAL MEDICINE

## 2024-04-30 PROCEDURE — 1160F RVW MEDS BY RX/DR IN RCRD: CPT | Performed by: INTERNAL MEDICINE

## 2024-04-30 PROCEDURE — 1036F TOBACCO NON-USER: CPT | Performed by: INTERNAL MEDICINE

## 2024-04-30 PROCEDURE — 1126F AMNT PAIN NOTED NONE PRSNT: CPT | Performed by: INTERNAL MEDICINE

## 2024-04-30 ASSESSMENT — PAIN SCALES - GENERAL: PAINLEVEL: 0-NO PAIN

## 2024-04-30 NOTE — PROGRESS NOTES
Subjective   Patient ID: [id number]   Whitley Tom is a 78 y.o. male who presents for New Patient Visit.  HPI  He is transferring care from Dr Shaikh. He has osteopenia with high frax.   DEXA 01/2023  Spine T score 0.6, BMD 1.16, L hip total T score-1.0, BMD 0.877, L hip neck T score -1.2, BMD 0.76  FRAX hip 6%, major OP 11    Current meds:  Prolia (12/2023)  Vitamin D-3 5000 international units  Calcium 1000mg daily    Father with hip fracture at 93. Mother with hip fracture at 88  Compression fracture of his vertebra T4-T5, and skull fracture after 10 ft fall 12-13 years ago. No fragility fractures.   He drinks ~1 beer daily.     Constitutional: Denies fever, chills, weight loss, night sweats or headaches  Eyes: + dry eyes after cataract surgery. No blurry vision, redness or pain or photophobia  ENT: Denies dry mouth, has partial dentures, loss of taste, nasal or oral ulcers, difficulty swallowing  Cardiovascular: Denies chest pain, palpitations, orthopnea  Respiratory: Denies shortness of breath, cough, asthma  Gastrointestinal: Denies dysphagia, nausea, vomiting, heartburn, abdominal pain, constipation, diarrhea, melena or hematochezia  Genitourinary: No recurrent urinary infections or STDs, no genital or anal ulcers.  Integumentary: Denies photosensitivity, rash or lesions, Raynaud's phenomenon,   Neurological: Denies any numbness or tingling   Hematologic/Lymphatic: Denies history of clots (arterial or venous)   MSK: No joint pains, redness, hotness or swelling. No inflammatory back pain, enthesitis, dactylitis. No morning stiffness   Muscular: Denies weakness, difficulty rising from chair or combing the hair, muscle aches, or problems with hand      PMH: septoplasty and sinus surgery 07/2023, osteopenia, L TKA 8-9 years ago  FHx: No family history of autoimmune diseases   Social: Nonsmoker, beer ~ daily, no drug use. Deb-retired, works on commercial farm     Patient Active Problem List   Diagnosis     Allergic rhinitis    Chronic fatigue    Dyslipidemia    Osteopenia    Vitamin D deficiency    Otitis media with effusion, right    Disorder of left eustachian tube    Myringotomy tube status    Osteoporosis    Chronic allergic bronchitis ([facility])        Past Medical History:   Diagnosis Date    Abnormal EKG 07/17/2023    Accident on farm     right arm Auger accident    Bilateral impacted cerumen 07/17/2023    Chest injury     kicked in midchest by a horse, observed at [facility], unspecified 11/03/2020    Diarrhea of presumed infectious origin    Fall 2011    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, TBI- SubArachnoid hemorrhage, hemothorax,  R temporal bone fx , L1 fx    Pneumonia 07/17/2023    Postnasal discharge 07/17/2023        Past Surgical History:   Procedure Laterality Date    OTHER SURGICAL HISTORY Left 03/15/2021    Knee surgery        Social History     Socioeconomic History    Marital status:      Spouse name: Not on file    Number of children: Not on file    Years of education: Not on file    Highest education level: Not on file   Occupational History    Not on file   Tobacco Use    Smoking status: Never    Smokeless tobacco: Never   Substance and Sexual Activity    Alcohol use: Yes     Comment: rare/wine/beer    Drug use: Never    Sexual activity: Not on file   Other Topics Concern    Not on file   Social History Narrative    Not on file     Social Determinants of Health     Financial Resource Strain: Not on file   Food Insecurity: Not on file   Transportation Needs: Not on file   Physical Activity: Not on file   Stress: No Stress Concern Present (12/12/2023)    [employer] [place] [employer] - Occupational Stress Questionnaire     Feeling of Stress : Not at all   Social Connections: Not on file   Intimate Partner Violence: Not on file   Housing Stability: Not on file        Allergies   Allergen Reactions    Oxycodone Unknown          Current Outpatient Medications:     calcium carbonate (Tums) 200  mg calcium chewable tablet, Chew 1 tablet (500 mg) once daily. OTC For osteopenia calcium supplementation, Disp: , Rfl:     cholecalciferol (Vitamin D-3) 50 MCG (2000 UT) tablet, Take 1 tablet (50 mcg) by mouth once daily. OTC, Disp: , Rfl:     nebulizer and compressor (EasyAir Compressor Nebulizer) device, 1 Units 4 times a day., Disp: 90 each, Rfl: 0    nebulizers misc, 1 Device 4 times a day as needed (use nebulizer with solution 4 times daily for wheezing or shortness of breath)., Disp: 1 each, Rfl: 0    ofloxacin (Floxin) 0.3 % otic solution, Instill 3 drops into affected ear(s), once a week, for 5 months, Disp: 5 mL, Rfl: 0    sod chloride-sodium bicarb (Ayr) nasal rinse, USE 2 APPLICATIONS BY NASAL ROUTE 2 TIMES A DAY AS DIRECTED, Disp: 50 each, Rfl: 0    dexAMETHasone (Decadron) 0.1 % ophthalmic solution, Instill 3 drops into affected ear(s), once a week, for 5 months (Patient not taking: Reported on 4/30/2024), Disp: 5 mL, Rfl: 0    predniSONE (Deltasone) 20 mg tablet, Take 3 tabs (60mg) daily for 3 days, then take 2 tabs (40mg) daily for 2 days, then take 1 tab (20mg) daily for 2 days. (Patient not taking: Reported on 4/30/2024), Disp: 15 tablet, Rfl: 0       Objective     Visit Vitals  /69 (BP Location: Right arm, Patient Position: Sitting)   Pulse 72   Temp 36.9 °C (98.4 °F)        Physical Exam  General: AAOx3, Cooperative  Head: normocephalic, atraumatic  Eyes: EOMI, conjunctiva clear, sclera white, anicteric  Ears: no redness, swelling, tenderness  Nose: no deformity, no crusting   Throat/Mouth: No oral deformities, no cheek swelling, mucosa appear moist, no oral ulcers noted or loss of dentition   Neck/Lymph: FROM, trachea midline  Lungs: chest expansion symmetric. No respiratory distress.   Heart: RRR  Neuro: CN II-XII grossly intact, no focal deficit  Skin: No rashes, ulcers or photosensitive areas  MSK: no synovitis of upper or lower extremities. L knee TKA scar.          Lab Results  "  Component Value Date    WBC 6.8 10/31/2020    HGB 14.4 10/31/2020    HCT 42.0 10/31/2020    MCV 99 10/31/2020     (L) 10/31/2020        Chemistry    Lab Results   Component Value Date/Time     10/31/2020 0655    K 4.0 10/31/2020 0655     10/31/2020 0655    CO2 24 10/31/2020 0655    BUN 13 10/31/2020 0655    CREATININE 0.84 10/31/2020 0655    Lab Results   Component Value Date/Time    CALCIUM 8.2 (L) 10/31/2020 0655    ALKPHOS 81 10/31/2020 0655    AST 19 10/31/2020 0655    ALT 28 10/31/2020 0655    BILITOT 0.4 10/31/2020 0655           Lab Results   Component Value Date    CRP 1.94 (A) 10/31/2020      No results found for: \"HAILY\", \"RF\", \"SEDRATE\"   No results found for: \"CKTOTAL\"  Lab Results   Component Value Date    NEUTROABS 4.80 10/29/2020      Lab Results   Component Value Date    FERRITIN 836 (H) 10/30/2020      No results found for: \"HEPATOT\", \"HEPAIGM\", \"HEPBCIGM\", \"HEPBCAB\", \"HBEAG\", \"HEPCAB\"   Lab Results   Component Value Date    ALT 28 10/31/2020    AST 19 10/31/2020    ALKPHOS 81 10/31/2020    BILITOT 0.4 10/31/2020      No results found for: \"PPD\"   No results found for: \"URICACID\"   Lab Results   Component Value Date    CALCIUM 8.2 (L) 10/31/2020      No results found for: \"SPEP\", \"UPEP\"   No results found for: \"ALBUR\", \"[id number]\"   .last          XR chest 2 views  Narrative: Interpreted By:  Juan Apodaca,   STUDY:  XR CHEST 2 VIEWS      INDICATION:  Signs/Symptoms:persistent cough, Left lwoer lung field end expir  wheezes.      COMPARISON:  February 24 23      ACCESSION NUMBER(S):  [id number]      ORDERING CLINICIAN:  NORMA WEST      FINDINGS:  Tortuous aorta unchanged. No consolidation, effusion, edema, or  pneumothorax.      Impression: No evidence of acute intrathoracic abnormality.      Signed by: Juan Apodaca 3/27/2024 5:55 PM  Dictation workstation:   [id number]     === 03/26/24 ===    XR CHEST 2 VIEWS    - Impression -  No evidence of acute intrathoracic " abnormality.    Signed by: Juan Apodaca 3/27/2024 5:55 PM  Dictation workstation:   [id number]      === 01/23/23 ===    DEXA BONE DENSITY AXIAL SKELETON W VFA       Assessment/Plan   A 78 year old M with osteopenia and high frax.   He has received 1st prolia in December 2023. Will obtain prior auth for Prolia due next month. Continue calcium and vitamin D. DEXA due 01/2025. No contraindications to BP in the future if needed.   Preprolia recurring labs ordered.      Zelda Becker MD   Rheumatology Fellow PGY-5    Rheumatology Attending    I interviewed and examined the patient and discussed the treatment plan. See above note for details.  I agree with the findings and plan of care summarized in the above note.       Tai Blancas MD

## 2024-05-13 ENCOUNTER — TELEPHONE (OUTPATIENT)
Dept: RHEUMATOLOGY | Facility: CLINIC | Age: 79
End: 2024-05-13
Payer: COMMERCIAL

## 2024-05-13 NOTE — TELEPHONE ENCOUNTER
Patient wants to know why there are no orders in the system for Prolia at [facility]. Please call [phone].

## 2024-05-14 NOTE — TELEPHONE ENCOUNTER
Brayan Kelsey,     When I saw this patient to establish care their preference would be [facility] since they live close by! At least that's what I remember them mentioning.     Also you asked about the pre-prolia labs and I had ordered them during that visit. Thanks!

## 2024-05-14 NOTE — TELEPHONE ENCOUNTER
I'm also going to reply to the patient's message and ask him to call the [facility] to schedule his prolia. I let him know that his prior auth was all set.

## 2024-05-17 NOTE — TELEPHONE ENCOUNTER
Patients wife called today indicating that they are still having a problem scheduling his Prolia at [facility]. She states they indicated that there are no orders in the system when she called to schedule it. She is very frustrated and needs this resolved ASAP. Please call her at [phone].

## 2024-05-20 ENCOUNTER — TELEPHONE (OUTPATIENT)
Dept: HEMATOLOGY/ONCOLOGY | Facility: HOSPITAL | Age: 79
End: 2024-05-20
Payer: COMMERCIAL

## 2024-05-20 DIAGNOSIS — M81.0 OSTEOPOROSIS, UNSPECIFIED OSTEOPOROSIS TYPE, UNSPECIFIED PATHOLOGICAL FRACTURE PRESENCE: Primary | ICD-10-CM

## 2024-05-20 RX ORDER — EPINEPHRINE 0.3 MG/.3ML
0.3 INJECTION SUBCUTANEOUS EVERY 5 MIN PRN
Status: CANCELLED | OUTPATIENT
Start: 2024-05-20

## 2024-05-20 RX ORDER — FAMOTIDINE 10 MG/ML
20 INJECTION INTRAVENOUS ONCE AS NEEDED
Status: CANCELLED | OUTPATIENT
Start: 2024-05-20

## 2024-05-20 RX ORDER — ALBUTEROL SULFATE 0.83 MG/ML
3 SOLUTION RESPIRATORY (INHALATION) AS NEEDED
Status: CANCELLED | OUTPATIENT
Start: 2024-05-20

## 2024-05-20 RX ORDER — DIPHENHYDRAMINE HYDROCHLORIDE 50 MG/ML
50 INJECTION INTRAMUSCULAR; INTRAVENOUS AS NEEDED
Status: CANCELLED | OUTPATIENT
Start: 2024-05-20

## 2024-05-20 NOTE — TELEPHONE ENCOUNTER
Received Prolia order, ordered by Zelda Becker MD. Patient's last dose 12/12/23. Patient due for injection on or after 6/9/24. Pre-cert pending review. Email sent to pre-cert team to obtain auth. Labs needed prior to appointment. Reached out to patient to schedule. Spoke with patient's wife, Gricelda. Informed wife of labs needed prior to appointment. Per wife, patient had labs done last week on 5/14/24. Informed wife those labs are ok to use if scheduled on or before 6/11/24. Wife agreeable to schedule on Monday, 6/10/24 in the morning. Scheduled for Monday, 6/10/24 at 8:30 AM. Wife verbalized understanding and agreed to upcoming appointment/plan of care.

## 2024-05-20 NOTE — PROGRESS NOTES
Continuation of therapy, Prolia therapy plan renewed under rheumatologist. Previous provider retired. Pt has been receiving Prolia injections at the [facility]

## 2024-05-23 NOTE — TELEPHONE ENCOUNTER
Call placed to pt to make aware of message sent via [employer] by provider due to pt not viewing. Unable to establish direct contact and vm left. Encouraged to call / message office with any questions/ concerns should they arise.

## 2024-05-28 ENCOUNTER — OFFICE VISIT (OUTPATIENT)
Dept: OTOLARYNGOLOGY | Facility: CLINIC | Age: 79
End: 2024-05-28
Payer: COMMERCIAL

## 2024-05-28 DIAGNOSIS — J01.20 ACUTE ETHMOIDAL SINUSITIS, RECURRENCE NOT SPECIFIED: Primary | ICD-10-CM

## 2024-05-28 DIAGNOSIS — J30.9 ALLERGIC RHINITIS, UNSPECIFIED SEASONALITY, UNSPECIFIED TRIGGER: ICD-10-CM

## 2024-05-28 PROCEDURE — 1159F MED LIST DOCD IN RCRD: CPT | Performed by: OTOLARYNGOLOGY

## 2024-05-28 PROCEDURE — 99214 OFFICE O/P EST MOD 30 MIN: CPT | Performed by: OTOLARYNGOLOGY

## 2024-05-28 PROCEDURE — 1160F RVW MEDS BY RX/DR IN RCRD: CPT | Performed by: OTOLARYNGOLOGY

## 2024-05-28 RX ORDER — FLUTICASONE PROPIONATE 50 MCG
2 SPRAY, SUSPENSION (ML) NASAL DAILY
Qty: 16 G | Refills: 3 | Status: SHIPPED | OUTPATIENT
Start: 2024-05-28

## 2024-05-28 RX ORDER — AMOXICILLIN AND CLAVULANATE POTASSIUM 875; 125 MG/1; MG/1
875 TABLET, FILM COATED ORAL 2 TIMES DAILY
Qty: 20 TABLET | Refills: 0 | Status: SHIPPED | OUTPATIENT
Start: 2024-05-28 | End: 2024-06-07

## 2024-05-28 RX ORDER — AZELASTINE 1 MG/ML
SPRAY, METERED NASAL
Qty: 30 ML | Refills: 11 | Status: SHIPPED | OUTPATIENT
Start: 2024-05-28

## 2024-05-28 NOTE — PROGRESS NOTES
Subjective   Patient ID: Whitley Tom is a 78 y.o. male who presents for follow up for ear and nose-sinus issues.    HPI    05.28.2024: Fluid feeling in left ear is better. Right ear feels clear. He feels like his sinuses are draining pretty bad. Postnasal discharge (+). He coughs to move it. His cough is overall better.     On examination, right TM is severely retracted, effusion (+). Both TMs look intact. Left nasal passage looks tight compared to right side. Left inferior turbinate's posterior part is hypertrophic. Residual deviation of nasal septum to left (+). Mucopurulent discharge was (+) in left middle meatus. Right middle meatus looks open and clear.    Plan:  1- oral generic augmentin tab  2- fluticasone nasal spray  3- azelastine nasal spray  4- follow up in one month  _________________________________________________________________    04.01.2024: He feels like there  is fluid in his left ear, he had septoplasty, and sinus surgery on 07.06.2023. He had a uri and has used 4 rounds of oral antibiotics.    On today's examination tube in right TM came out. Right TM looks adhesive retracted posteriorly. Left TM looks intact. I have not seen fluid on today's exam. He has bilateral type c tympanograms -196 dapa at left and -236 dapa at right.     Nasal endoscopy did not show any purulent discharge in nose. Nasal passages look open bilaterally.     He has dry cough going on for a while. There is a cough triggering point at subglottic - suprasternal area. Local steroid injection was done.    Plan:  1- follow up in May 2024, sooner if needed  _________________________________________________________________    11.27.2023: He had his hearing test today.  At left ear there is sloping type of hearing loss at high frequencies and mild slope at right ear at high and at low frequencies.  He feels like his hearing at left is not as good as his hearing at right.  Since most of his problem is sensorineural, I recommend  to consider using a hearing aid for the left ear.  On examination, ventilation tube seems to be touching right middle ear mucosa, which may cause chronic inflammation over time.    Recommendations:  1-use eardrops once a week to suppress foreign body reaction at right ear  2-consider using a hearing aid for the left ear    _________________________________________________________________    10/13/2023: He comes for follow up. Nose and sinuses feel good. On examination nasal passages look open bilaterally. Tube in right TM, looks open. No granulation or discharge. Pinpoint opening at left TM posterosuperiorly.     Plan:  1- hearing test  2- follow up in 6 months    _________________________________________________________________    10/10/2023: No nose and sinus problems essentially. Left ear feels full. He recently had a slight cold.     On examination, right TM is severely retracted and there is fluid in right middle ear. Left TM is intact.    Dx:  1- right otitis media with effusion  2- retraction of right TM  3- left ETD    Today he had insertion of right ventilation tube and left needle myringotomy with intratympanic steroid injection.    Plan:  1- ear drops  2- follow up in 2 weeks    _________________________________________________________________      08.22.2023: He had surgery on 07.06.2023.     Diagnosis:   1- deviated nasal septum,   2- michael bullosa (left),   3- chronic postnasal discharge,   4- bilateral maxillary sinusitis,   5- congestion of left inferior turbinate   6- retraction-adhesion of right tympanic membrane   7- right otitis media with effusion      Procedure:   1. septoplasty   2. reduction of left michael bullosa   3. radiofrequency reduction of inferior turbinates   4. partial ethmoidectomy   5. maxillary antrostomy   6. clarifix cryotherapy   7. right myringotomy      Findings: septum deviated to left anteriorly, deviated to right posteriorly, left michael bullosa, bilateral marcos cells,  right maxillary antrum was closed. Retraction-adhesion of right TM, right OME.     On examination, there was crusting over the left inferior turbinate. Cleaning was done. Prominent left nasal septal swell body (+). There was mucopurulent discharge coming out of right middle meatus. Synechia in middle meatus bilaterally. Patient did not use the ointment and nasal rinse as instructed.     Recommendation:  1â€“Daily nasal rinse and antibiotic ointment  2â€“follow-up in 2 months, consider lyses of synechia, if symptomatic.           ____________________________________________________________________________________________        Mr. Tom is a 76 yo M. He comes for nose sinus issues and ear wax.  He has lots of drainage from his sinuses most of the time, mostly postnasal.   It tickles his throat and causes him to cough. This has been going on for decades.  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     History of ear tubes, years ago.  Difficulty with hearing, ears feel full.     On examination, there was wax in ears bilaterally. Cleaning was done. RIght TM is retracted and there is fluid (+). LEft TM looks essentially normal.      Nasal septum is deviated to left anteriorly and to right superiorly and posteriorly  No visible purulent postnasal discharge.      Dx:  1- Deviated nasal septum  2- RIght otitis media with effusion  3- wax build up bilateral (cleaned)     Plan:  1- septoplasty, RF turbinate reduction  2- insertion of right t -tube  3- hearing test  4- clarifix cryotherapy for postnasal thick mucoid discharge   _________________________________________________________________    04.30.2023: CT scan shows deviated nasal septum to left anteriorly and severely deviated nasal septum to right posteriorly. Congestion of right inferior turbinate, left michael bullosa, bilateral mucosal thickenings at maxillary sinuses.     Modified plan is septoplasty,  "reduction of left michael bullosa, RF inferior turbinate reduction, clarifix cryotherapy for postnasal discharge, limited sinus surgery 43164, [id number].      Chief Complaint     \"Suggested by Primary (ear wax) sinus\"      Adult Risk ScreeningAdult Risk Screening_[facility]: There are no spiritual/cultural practices/values/needs that are important to know   Initial Fall Risk Screening:   ZAHRA has not fallen in the last 6 months. His fall did not result in injury. ZAHRA does not have a fear of falling. He does not need assistance with sitting, standing or walking. Does not need assistance walking in his home. He does not need assistance in an unfamiliar setting. The patient is not using an assistive device.        Domestic Violence Screen: Does not feel threatened or abused physically, emotionally or sexually. Do you feel UNSAFE?   The patient feels safe in the home.   Depression/Suicide Screening:   During the past 2 weeks, the patient has not felt down, depressed or hopeless.    During the past 2 weeks, the patient has not felt little interest or pleasure in doing things.       Review of Systems  History of rar fullness (+)    Objective   Physical Exam  (old exam note)     General appearance: Healthy-appearing, well-nourished, well groomed, in no acute distress.      Head and Face: Atraumatic with no masses, lesions, or scarring.      Salivary glands: No tenderness of the parotid glands or parotid masses.      No tenderness of the submandibular glands or submandibular masses.      Facial strength: Normal strength and symmetry, no synkinesis or facial tic.      Eyes: Conjunctivas look non-hyperemic bilaterally     Ears: Bilaterally ear canals look normal. Tympanic membranes intact, no hyperemia, fluid or retraction.      Nose: Mucosa looks normal. No purulent discharge. Septum deviated to left / right / essentially straight.     Oral Cavity/Mouth: Lips and tongue look normal.      Throat: No postnasal discharge. No tonsil " hypertrophy. No hyperemia.     Neck: Symmetrical, trachea midline.      Pulmonary: Normal respiratory effort.      Lymphatic: No palpable pathologic lymph nodes at neck.      Neurological/Psychiatric: Orientation to person, place, and time: Normal.   Mood and affect: Normal.      Extremities: No clubbing.        Procedure    BILATERAL NEEDLE MYRINGOTOMY and INTRATYMPANIC STEROID INJECTION 04.01.2024    Operative microscope was brought to patient's left ear. Topical phenol was applied posteriorly, then myringotomy was done using 25 g needle and ~0.4 ml 10mg/ml dexamethasone was injected intratympanically. Antibiotic drops were applied.    Operative microscope was brought to patient's right ear. Topical phenol was applied posteriorly, then myringotomy was done using 27 g needle and ~0.2 ml 10mg/ml dexamethasone was injected intratympanically. Antibiotic drops were applied.    _________________________________________________________________    INJECTION NOTE 04.01.2024  ~10 mg dexamethasone mixed with local anesthetics was injected to suprasternal, subglottic area towards the external branches of superior laryngeal nerve for subacute dry cough.    _________________________________________________________________    LEFT NEEDLE MYRINGOTOMY and INTRATYMPANIC STEROID INJECTION 10/10/2023  Operative microscope was brought to patient's left ear. Topical phenol was applied posteriorly, then myringotomy was done using 25 g needle and ~0.4 ml 10mg/ml dexamethasone was injected intratympanically. Antibiotic drops were applied.    RIGHT VENTILATION TUBE INSERTION 10/10/2023  Operative microscope was brought to patient's right ear. Topical phenol was applied anteroinferiorly, then myringotomy was done using 21 g needle. Fluid was suctioned. Brad bobbin ventilation tube was inserted. Antibiotic drops were applied.    Procedure was concluded.       Assessment/Plan     05.28.2024: Fluid feeling in left ear is better. Right ear  feels clear. He feels like his sinuses are draining pretty bad. Postnasal discharge (+). He coughs to move it. His cough is overall better.     On examination, right TM is severely retracted, effusion (+). Both TMs look intact. Left nasal passage looks tight compared to right side. Left inferior turbinate's posterior part is hypertrophic. Residual deviation of nasal septum to left (+). Mucopurulent discharge was (+) in left middle meatus. Right middle meatus looks open and clear.    Plan:  1- oral generic augmentin tab  2- fluticasone nasal spray  3- azelastine nasal spray  4- follow up in one month  _________________________________________________________________    04.01.2024: He feels like there  is fluid in his left ear, he had septoplasty, and sinus surgery on 07.06.2023. He had a uri and has used 4 rounds of oral antibiotics.    On today's examination tube in right TM came out. Right TM looks adhesive retracted posteriorly. Left TM looks intact. I have not seen fluid on today's exam. He has bilateral type c tympanograms -196 dapa at left and -236 dapa at right.     Nasal endoscopy did not show any purulent discharge in nose. Nasal passages look open bilaterally.     He has dry cough going on for a while. There is a cough triggering point at subglottic - suprasternal area. Local steroid injection was done.    Plan:  1- follow up in May 2024, sooner if needed  _________________________________________________________________    11.27.2023: He had his hearing test today.  At left ear there is sloping type of hearing loss at high frequencies and mild slope at right ear at high and at low frequencies.  He feels like his hearing at left is not good good as his hearing at right.  Since most of his problem is sensorineural, I recommend to consider using a hearing aid for the left ear.  On examination, ventilation tube seems to be touching right middle ear mucosa, which may cause chronic inflammation over  time.    Recommendations:  1-use eardrops once a week to suppress foreign body reaction at right ear  2-consider using a hearing aid for the left ear    _________________________________________________________________    10/13/2023: He comes for follow up. Nose and sinuses feel good. On examination nasal passages look open bilaterally. Tube in right TM, looks open. No granulation or discharge. Pinpoint opening at left TM posterosuperiorly.     Plan:  1- hearing test  2- follow up in 6 months    _________________________________________________________________    10/10/2023: No nose and sinus problems essentially. Left ear feels full. He recently had a slight cold.     On examination, right TM is severely retracted and there is fluid in right middle ear. Left TM is intact.    Dx:  1- right otitis media with effusion  2- retraction of right TM  3- left ETD    Today he had insertion of right ventilation tube and left needle myringotomy with intratympanic steroid injection.    Plan:  1- ear drops  2- follow up in 2 weeks

## 2024-05-31 DIAGNOSIS — J01.20 ACUTE ETHMOIDAL SINUSITIS, RECURRENCE NOT SPECIFIED: Primary | ICD-10-CM

## 2024-05-31 RX ORDER — CIPROFLOXACIN 500 MG/1
500 TABLET ORAL 2 TIMES DAILY
Qty: 20 TABLET | Refills: 0 | Status: SHIPPED | OUTPATIENT
Start: 2024-05-31 | End: 2024-06-10

## 2024-05-31 RX ORDER — DOXYCYCLINE HYCLATE 100 MG
100 TABLET ORAL 2 TIMES DAILY
Qty: 20 TABLET | Refills: 0 | Status: SHIPPED | OUTPATIENT
Start: 2024-05-31 | End: 2024-05-31

## 2024-06-10 ENCOUNTER — INFUSION (OUTPATIENT)
Dept: HEMATOLOGY/ONCOLOGY | Facility: HOSPITAL | Age: 79
End: 2024-06-10
Payer: COMMERCIAL

## 2024-06-10 VITALS
RESPIRATION RATE: 16 BRPM | DIASTOLIC BLOOD PRESSURE: 66 MMHG | SYSTOLIC BLOOD PRESSURE: 129 MMHG | HEIGHT: 71 IN | WEIGHT: 184.97 LBS | OXYGEN SATURATION: 97 % | TEMPERATURE: 97.5 F | BODY MASS INDEX: 25.9 KG/M2 | HEART RATE: 78 BPM

## 2024-06-10 DIAGNOSIS — M81.0 OSTEOPOROSIS, UNSPECIFIED OSTEOPOROSIS TYPE, UNSPECIFIED PATHOLOGICAL FRACTURE PRESENCE: ICD-10-CM

## 2024-06-10 LAB
ALBUMIN SERPL BCP-MCNC: 3.9 G/DL (ref 3.4–5)
ALP SERPL-CCNC: 68 U/L (ref 33–136)
ALT SERPL W P-5'-P-CCNC: 13 U/L (ref 10–52)
ANION GAP SERPL CALC-SCNC: 12 MMOL/L (ref 10–20)
AST SERPL W P-5'-P-CCNC: 14 U/L (ref 9–39)
BILIRUB SERPL-MCNC: 1.1 MG/DL (ref 0–1.2)
BUN SERPL-MCNC: 17 MG/DL (ref 6–23)
CALCIUM SERPL-MCNC: 9.2 MG/DL (ref 8.6–10.3)
CHLORIDE SERPL-SCNC: 105 MMOL/L (ref 98–107)
CO2 SERPL-SCNC: 23 MMOL/L (ref 21–32)
CREAT SERPL-MCNC: 1.12 MG/DL (ref 0.5–1.3)
EGFRCR SERPLBLD CKD-EPI 2021: 68 ML/MIN/1.73M*2
GLUCOSE SERPL-MCNC: 95 MG/DL (ref 74–99)
POTASSIUM SERPL-SCNC: 3.7 MMOL/L (ref 3.5–5.3)
PROT SERPL-MCNC: 7 G/DL (ref 6.4–8.2)
SODIUM SERPL-SCNC: 136 MMOL/L (ref 136–145)

## 2024-06-10 PROCEDURE — 96372 THER/PROPH/DIAG INJ SC/IM: CPT

## 2024-06-10 PROCEDURE — 2500000004 HC RX 250 GENERAL PHARMACY W/ HCPCS (ALT 636 FOR OP/ED): Mod: JZ | Performed by: INTERNAL MEDICINE

## 2024-06-10 PROCEDURE — 84075 ASSAY ALKALINE PHOSPHATASE: CPT

## 2024-06-10 RX ORDER — FAMOTIDINE 10 MG/ML
20 INJECTION INTRAVENOUS ONCE AS NEEDED
OUTPATIENT
Start: 2024-12-07

## 2024-06-10 RX ORDER — ALBUTEROL SULFATE 0.83 MG/ML
3 SOLUTION RESPIRATORY (INHALATION) AS NEEDED
OUTPATIENT
Start: 2024-12-07

## 2024-06-10 RX ORDER — DIPHENHYDRAMINE HYDROCHLORIDE 50 MG/ML
50 INJECTION INTRAMUSCULAR; INTRAVENOUS AS NEEDED
OUTPATIENT
Start: 2024-12-07

## 2024-06-10 RX ORDER — EPINEPHRINE 0.3 MG/.3ML
0.3 INJECTION SUBCUTANEOUS EVERY 5 MIN PRN
OUTPATIENT
Start: 2024-12-07

## 2024-06-10 RX ADMIN — DENOSUMAB 60 MG: 60 INJECTION SUBCUTANEOUS at 09:13

## 2024-06-10 ASSESSMENT — PAIN SCALES - GENERAL: PAINLEVEL: 0-NO PAIN

## 2024-06-10 ASSESSMENT — PATIENT HEALTH QUESTIONNAIRE - PHQ9
1. LITTLE INTEREST OR PLEASURE IN DOING THINGS: NOT AT ALL
SUM OF ALL RESPONSES TO PHQ9 QUESTIONS 1 & 2: 0
2. FEELING DOWN, DEPRESSED OR HOPELESS: NOT AT ALL

## 2024-06-19 DIAGNOSIS — J30.9 ALLERGIC RHINITIS, UNSPECIFIED SEASONALITY, UNSPECIFIED TRIGGER: ICD-10-CM

## 2024-06-24 RX ORDER — FLUTICASONE PROPIONATE 50 MCG
SPRAY, SUSPENSION (ML) NASAL
Qty: 48 ML | Refills: 2 | OUTPATIENT
Start: 2024-06-24

## 2024-06-28 ENCOUNTER — APPOINTMENT (OUTPATIENT)
Dept: OTOLARYNGOLOGY | Facility: CLINIC | Age: 79
End: 2024-06-28
Payer: COMMERCIAL

## 2024-06-28 DIAGNOSIS — J30.9 ALLERGIC RHINITIS, UNSPECIFIED SEASONALITY, UNSPECIFIED TRIGGER: ICD-10-CM

## 2024-06-28 DIAGNOSIS — H69.91 DISORDER OF RIGHT EUSTACHIAN TUBE: Primary | ICD-10-CM

## 2024-06-28 PROCEDURE — 1159F MED LIST DOCD IN RCRD: CPT | Performed by: OTOLARYNGOLOGY

## 2024-06-28 PROCEDURE — 1036F TOBACCO NON-USER: CPT | Performed by: OTOLARYNGOLOGY

## 2024-06-28 PROCEDURE — 99213 OFFICE O/P EST LOW 20 MIN: CPT | Performed by: OTOLARYNGOLOGY

## 2024-06-28 RX ORDER — AZELASTINE 1 MG/ML
SPRAY, METERED NASAL
Qty: 90 ML | Refills: 3 | Status: SHIPPED | OUTPATIENT
Start: 2024-06-28

## 2024-06-28 RX ORDER — FLUTICASONE PROPIONATE 50 MCG
2 SPRAY, SUSPENSION (ML) NASAL DAILY
Qty: 48 G | Refills: 3 | Status: SHIPPED | OUTPATIENT
Start: 2024-06-28 | End: 2024-09-26

## 2024-06-28 NOTE — PROGRESS NOTES
Subjective   Patient ID: Whitley Tom is a 79 y.o. male who presents for follow up for ear and nose-sinus issues.    HPI    06.28.2024: He comes for follow up. Ears mostly feel open. Breathing most of the time no problems. He still has postnasal discharge. Off and on he coughs, but overall it is better. He uses nasal sprays.     On examination, right TM is retracted and right middle ear has effusion. Left TM looks intact, no effusion. Nasal passages look open. Residual deviation to left.     Plan:  1-follow-up in 1 year  2-continue nasal sprays  Consider T-tube for right ear again, I tried it in the past, but could not place., TM severely retracted. It could be combined with myringoplasty/tympanoplasty.    _________________________________________________________________    05.28.2024: Fluid feeling in left ear is better. Right ear feels clear. He feels like his sinuses are draining pretty bad. Postnasal discharge (+). He coughs to move it. His cough is overall better.     On examination, right TM is severely retracted, effusion (+). Both TMs look intact. Left nasal passage looks tight compared to right side. Left inferior turbinate's posterior part is hypertrophic. Residual deviation of nasal septum to left (+). Mucopurulent discharge was (+) in left middle meatus. Right middle meatus looks open and clear.    Plan:  1- oral generic augmentin tab  2- fluticasone nasal spray  3- azelastine nasal spray  4- follow up in one month  _________________________________________________________________    04.01.2024: He feels like there  is fluid in his left ear, he had septoplasty, and sinus surgery on 07.06.2023. He had a uri and has used 4 rounds of oral antibiotics.    On today's examination tube in right TM came out. Right TM looks adhesive retracted posteriorly. Left TM looks intact. I have not seen fluid on today's exam. He has bilateral type c tympanograms -196 dapa at left and -236 dapa at right.     Nasal  endoscopy did not show any purulent discharge in nose. Nasal passages look open bilaterally.     He has dry cough going on for a while. There is a cough triggering point at subglottic - suprasternal area. Local steroid injection was done.    Plan:  1- follow up in May 2024, sooner if needed  _________________________________________________________________    11.27.2023: He had his hearing test today.  At left ear there is sloping type of hearing loss at high frequencies and mild slope at right ear at high and at low frequencies.  He feels like his hearing at left is not as good as his hearing at right.  Since most of his problem is sensorineural, I recommend to consider using a hearing aid for the left ear.  On examination, ventilation tube seems to be touching right middle ear mucosa, which may cause chronic inflammation over time.    Recommendations:  1-use eardrops once a week to suppress foreign body reaction at right ear  2-consider using a hearing aid for the left ear    _________________________________________________________________    10/13/2023: He comes for follow up. Nose and sinuses feel good. On examination nasal passages look open bilaterally. Tube in right TM, looks open. No granulation or discharge. Pinpoint opening at left TM posterosuperiorly.     Plan:  1- hearing test  2- follow up in 6 months    _________________________________________________________________    10/10/2023: No nose and sinus problems essentially. Left ear feels full. He recently had a slight cold.     On examination, right TM is severely retracted and there is fluid in right middle ear. Left TM is intact.    Dx:  1- right otitis media with effusion  2- retraction of right TM  3- left ETD    Today he had insertion of right ventilation tube and left needle myringotomy with intratympanic steroid injection.    Plan:  1- ear drops  2- follow up in 2  weeks    _________________________________________________________________      08.22.2023: He had surgery on 07.06.2023.     Diagnosis:   1- deviated nasal septum,   2- michael bullosa (left),   3- chronic postnasal discharge,   4- bilateral maxillary sinusitis,   5- congestion of left inferior turbinate   6- retraction-adhesion of right tympanic membrane   7- right otitis media with effusion      Procedure:   1. septoplasty   2. reduction of left michael bullosa   3. radiofrequency reduction of inferior turbinates   4. partial ethmoidectomy   5. maxillary antrostomy   6. clarifix cryotherapy   7. right myringotomy      Findings: septum deviated to left anteriorly, deviated to right posteriorly, left michael bullosa, bilateral marcos cells, right maxillary antrum was closed. Retraction-adhesion of right TM, right OME.     On examination, there was crusting over the left inferior turbinate. Cleaning was done. Prominent left nasal septal swell body (+). There was mucopurulent discharge coming out of right middle meatus. Synechia in middle meatus bilaterally. Patient did not use the ointment and nasal rinse as instructed.     Recommendation:  1â€“Daily nasal rinse and antibiotic ointment  2â€“follow-up in 2 months, consider lyses of synechia, if symptomatic.           ____________________________________________________________________________________________        Mr. Tom is a 78 yo M. He comes for nose sinus issues and ear wax.  He has lots of drainage from his sinuses most of the time, mostly postnasal.   It tickles his throat and causes him to cough. This has been going on for decades.  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     History of ear tubes, years ago.  Difficulty with hearing, ears feel full.     On examination, there was wax in ears bilaterally. Cleaning was done. RIght TM is retracted and there is fluid (+). LEft TM looks essentially  "normal.      Nasal septum is deviated to left anteriorly and to right superiorly and posteriorly  No visible purulent postnasal discharge.      Dx:  1- Deviated nasal septum  2- RIght otitis media with effusion  3- wax build up bilateral (cleaned)     Plan:  1- septoplasty, RF turbinate reduction  2- insertion of right t -tube  3- hearing test  4- clarifix cryotherapy for postnasal thick mucoid discharge   _________________________________________________________________    04.30.2023: CT scan shows deviated nasal septum to left anteriorly and severely deviated nasal septum to right posteriorly. Congestion of right inferior turbinate, left michael bullosa, bilateral mucosal thickenings at maxillary sinuses.     Modified plan is septoplasty, reduction of left michael bullosa, RF inferior turbinate reduction, clarifix cryotherapy for postnasal discharge, limited sinus surgery [id number], [id number].      Chief Complaint     \"Suggested by Primary (ear wax) sinus\"      Adult Risk ScreeningAdult Risk Screening_UH: There are no spiritual/cultural practices/values/needs that are important to know   Initial Fall Risk Screening:   ZAHRA has not fallen in the last 6 months. His fall did not result in injury. ZAHRA does not have a fear of falling. He does not need assistance with sitting, standing or walking. Does not need assistance walking in his home. He does not need assistance in an unfamiliar setting. The patient is not using an assistive device.        Domestic Violence Screen: Does not feel threatened or abused physically, emotionally or sexually. Do you feel UNSAFE?   The patient feels safe in the home.   Depression/Suicide Screening:   During the past 2 weeks, the patient has not felt down, depressed or hopeless.    During the past 2 weeks, the patient has not felt little interest or pleasure in doing things.       Review of Systems  History of rar fullness (+)    Objective   Physical Exam  (old exam note)     General " appearance: Healthy-appearing, well-nourished, well groomed, in no acute distress.      Head and Face: Atraumatic with no masses, lesions, or scarring.      Salivary glands: No tenderness of the parotid glands or parotid masses.      No tenderness of the submandibular glands or submandibular masses.      Facial strength: Normal strength and symmetry, no synkinesis or facial tic.      Eyes: Conjunctivas look non-hyperemic bilaterally     Ears: Bilaterally ear canals look normal. Tympanic membranes intact, no hyperemia, fluid or retraction.      Nose: Mucosa looks normal. No purulent discharge. Septum deviated to left / right / essentially straight.     Oral Cavity/Mouth: Lips and tongue look normal.      Throat: No postnasal discharge. No tonsil hypertrophy. No hyperemia.     Neck: Symmetrical, trachea midline.      Pulmonary: Normal respiratory effort.      Lymphatic: No palpable pathologic lymph nodes at neck.      Neurological/Psychiatric: Orientation to person, place, and time: Normal.   Mood and affect: Normal.      Extremities: No clubbing.        Procedure    BILATERAL NEEDLE MYRINGOTOMY and INTRATYMPANIC STEROID INJECTION 04.01.2024    Operative microscope was brought to patient's left ear. Topical phenol was applied posteriorly, then myringotomy was done using 25 g needle and ~0.4 ml 10mg/ml dexamethasone was injected intratympanically. Antibiotic drops were applied.    Operative microscope was brought to patient's right ear. Topical phenol was applied posteriorly, then myringotomy was done using 27 g needle and ~0.2 ml 10mg/ml dexamethasone was injected intratympanically. Antibiotic drops were applied.    _________________________________________________________________    INJECTION NOTE 04.01.2024  ~10 mg dexamethasone mixed with local anesthetics was injected to suprasternal, subglottic area towards the external branches of superior laryngeal nerve for subacute dry  cough.    _________________________________________________________________    LEFT NEEDLE MYRINGOTOMY and INTRATYMPANIC STEROID INJECTION 10/10/2023  Operative microscope was brought to patient's left ear. Topical phenol was applied posteriorly, then myringotomy was done using 25 g needle and ~0.4 ml 10mg/ml dexamethasone was injected intratympanically. Antibiotic drops were applied.    RIGHT VENTILATION TUBE INSERTION 10/10/2023  Operative microscope was brought to patient's right ear. Topical phenol was applied anteroinferiorly, then myringotomy was done using 21 g needle. Fluid was suctioned. Brad bobbin ventilation tube was inserted. Antibiotic drops were applied.    Procedure was concluded.       Assessment/Plan     06.28.2024: He comes for follow up. Ears mostly feel open. Breathing most of the time no problems. He still has postnasal discharge. Off and on he coughs, but overall it is better. He uses nasal sprays.     On examination, right TM is retracted and right middle ear has effusion. Left TM looks intact, no effusion. Nasal passages look open. Residual deviation to left.     Plan:  1-follow-up in 1 year  2-continue nasal sprays  Consider T-tube for right ear again, I tried it in the past, but could not place., TM severely retracted. It could be combined with myringoplasty/tympanoplasty.    _________________________________________________________________    05.28.2024: Fluid feeling in left ear is better. Right ear feels clear. He feels like his sinuses are draining pretty bad. Postnasal discharge (+). He coughs to move it. His cough is overall better.     On examination, right TM is severely retracted, effusion (+). Both TMs look intact. Left nasal passage looks tight compared to right side. Left inferior turbinate's posterior part is hypertrophic. Residual deviation of nasal septum to left (+). Mucopurulent discharge was (+) in left middle meatus. Right middle meatus looks open and  clear.    Plan:  1- oral generic augmentin tab  2- fluticasone nasal spray  3- azelastine nasal spray  4- follow up in one month  _________________________________________________________________    04.01.2024: He feels like there  is fluid in his left ear, he had septoplasty, and sinus surgery on 07.06.2023. He had a uri and has used 4 rounds of oral antibiotics.    On today's examination tube in right TM came out. Right TM looks adhesive retracted posteriorly. Left TM looks intact. I have not seen fluid on today's exam. He has bilateral type c tympanograms -196 dapa at left and -236 dapa at right.     Nasal endoscopy did not show any purulent discharge in nose. Nasal passages look open bilaterally.     He has dry cough going on for a while. There is a cough triggering point at subglottic - suprasternal area. Local steroid injection was done.    Plan:  1- follow up in May 2024, sooner if needed  _________________________________________________________________    11.27.2023: He had his hearing test today.  At left ear there is sloping type of hearing loss at high frequencies and mild slope at right ear at high and at low frequencies.  He feels like his hearing at left is not good good as his hearing at right.  Since most of his problem is sensorineural, I recommend to consider using a hearing aid for the left ear.  On examination, ventilation tube seems to be touching right middle ear mucosa, which may cause chronic inflammation over time.    Recommendations:  1-use eardrops once a week to suppress foreign body reaction at right ear  2-consider using a hearing aid for the left ear    _________________________________________________________________    10/13/2023: He comes for follow up. Nose and sinuses feel good. On examination nasal passages look open bilaterally. Tube in right TM, looks open. No granulation or discharge. Pinpoint opening at left TM posterosuperiorly.     Plan:  1- hearing test  2- follow up in 6  months    _________________________________________________________________    10/10/2023: No nose and sinus problems essentially. Left ear feels full. He recently had a slight cold.     On examination, right TM is severely retracted and there is fluid in right middle ear. Left TM is intact.    Dx:  1- right otitis media with effusion  2- retraction of right TM  3- left ETD    Today he had insertion of right ventilation tube and left needle myringotomy with intratympanic steroid injection.    Plan:  1- ear drops  2- follow up in 2 weeks

## 2024-07-18 ENCOUNTER — APPOINTMENT (OUTPATIENT)
Dept: PRIMARY CARE | Facility: CLINIC | Age: 79
End: 2024-07-18
Payer: COMMERCIAL

## 2024-07-18 VITALS
WEIGHT: 187.2 LBS | OXYGEN SATURATION: 94 % | SYSTOLIC BLOOD PRESSURE: 110 MMHG | DIASTOLIC BLOOD PRESSURE: 70 MMHG | HEART RATE: 62 BPM | TEMPERATURE: 96.9 F | BODY MASS INDEX: 26.47 KG/M2

## 2024-07-18 DIAGNOSIS — Z00.00 ROUTINE GENERAL MEDICAL EXAMINATION AT A HEALTH CARE FACILITY: Primary | ICD-10-CM

## 2024-07-18 PROCEDURE — 1124F ACP DISCUSS-NO DSCNMKR DOCD: CPT | Performed by: PHYSICIAN ASSISTANT

## 2024-07-18 PROCEDURE — 1036F TOBACCO NON-USER: CPT | Performed by: PHYSICIAN ASSISTANT

## 2024-07-18 PROCEDURE — 99397 PER PM REEVAL EST PAT 65+ YR: CPT | Performed by: PHYSICIAN ASSISTANT

## 2024-07-18 NOTE — PROGRESS NOTES
Subjective     HPI   Whitley Tom is a 79 y.o. year old male patient with presenting to clinic with concern for   Chief Complaint   Patient presents with    Results     BW    Hyperlipidemia       Advance Care Planning  0--- none? Gricelda has POA in her chart.  Advance Care Planning was discussed with patient and family. Advanced directives and POA and the patient's Advance Care Plan can be documented in the EMR when/if they have plans in order and provide paperwork-      Persistent cough this winter- sinusitis 1/2/24, bronchitis 1/16, sinusitis & bronchitis 2/28  Has seen Dr. Gil- had sinus surgery July 2023.    Last labs 11/2023,  wait until medicare Jan 2025    Seeing Dr Becker Rheumatology- recently had CMP, but not vitamin D or other labs done through [facility].    Going back for another cataract surgery  Patient Active Problem List   Diagnosis    Allergic rhinitis    Chronic fatigue    Dyslipidemia    Osteopenia    Vitamin D deficiency    Otitis media with effusion, right    Disorder of left eustachian tube    Myringotomy tube status    Osteoporosis    Chronic allergic bronchitis ([facility])       Past Medical History:   Diagnosis Date    Abnormal EKG 07/17/2023    Accident on farm     right arm Auger accident    Bilateral impacted cerumen 07/17/2023    Chest injury     kicked in midchest by a horse, observed at [facility], unspecified 11/03/2020    Diarrhea of presumed infectious origin    Fall 2011    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, TBI- SubArachnoid hemorrhage, hemothorax,  R temporal bone fx , L1 fx    Pneumonia 07/17/2023    Postnasal discharge 07/17/2023      Past Surgical History:   Procedure Laterality Date    OTHER SURGICAL HISTORY Left 03/15/2021    Knee surgery      Family History   Problem Relation Name Age of Onset    Heart disease Mother      Hypertension Father      No Known Problems Other        Social History     Tobacco Use    Smoking status: Never    Smokeless tobacco: Never   Substance Use Topics     Alcohol use: Yes     Comment: rare/wine/beer        Current Outpatient Medications:     azelastine (Astelin) 137 mcg (0.1 %) nasal spray, Use 2 sprays in each nostril once a day, Disp: 90 mL, Rfl: 3    calcium carbonate (Tums) 200 mg calcium chewable tablet, Chew 1 tablet (500 mg) once daily. OTC For osteopenia calcium supplementation, Disp: , Rfl:     cholecalciferol (Vitamin D-3) 50 MCG (2000 UT) tablet, Take 1 tablet (50 mcg) by mouth once daily. OTC, Disp: , Rfl:     fluticasone (Flonase) 50 mcg/actuation nasal spray, Administer 2 sprays into each nostril once daily. Shake gently. Before first use, prime pump. After use, clean tip and replace cap., Disp: 48 g, Rfl: 3    nebulizer and compressor (EasyAir Compressor Nebulizer) device, 1 Units 4 times a day., Disp: 90 each, Rfl: 0    nebulizers misc, 1 Device 4 times a day as needed (use nebulizer with solution 4 times daily for wheezing or shortness of breath)., Disp: 1 each, Rfl: 0     Review of Systems  Constitutional: Denies fever  HEENT: Denies ST, earache  CVS: Denies Chest pain  Pulmonary: Denies wheezing, SOB  GI: Denies N/V  : Denies dysuria  Musculoskeletal:  Denies myalgia  Neuro: Denies focal weakness or numbness.  Skin: Denies Rashes.  *Review of Systems is negative unless otherwise mentioned in HPI or ROS above.    Objective   /70   Pulse 62   Temp 36.1 °C (96.9 °F)   Wt 84.9 kg (187 lb 3.2 oz)   SpO2 94%   BMI 26.47 kg/m²  reviewed Body mass index is 26.47 kg/m².     Physical Exam  Constitutional: NAD.  Resting comfortably.  Head: Atraumatic, normocephalic.  ENT: Moist oral mucosa. Nasal mucosa wnl.   Cardiac: Regular rate & rhythm.   Pulmonary: Lungs clear bilat  GI: Soft, Nontender, nondistended.   Musculoskeletal: No peripheral edema.   Skin: No evidence of trauma. No rashes  Psych: Intact judgement and insight.    .Assessment/Plan   Problem List Items Addressed This Visit    None  Visit Diagnoses         Codes    Routine general  medical examination at a health care facility    -  [facility] Z00.00

## 2024-12-09 ENCOUNTER — INFUSION (OUTPATIENT)
Dept: HEMATOLOGY/ONCOLOGY | Facility: HOSPITAL | Age: 79
End: 2024-12-09
Payer: COMMERCIAL

## 2024-12-09 VITALS
BODY MASS INDEX: 25.98 KG/M2 | RESPIRATION RATE: 16 BRPM | TEMPERATURE: 96.3 F | WEIGHT: 181.44 LBS | HEART RATE: 71 BPM | OXYGEN SATURATION: 98 % | DIASTOLIC BLOOD PRESSURE: 81 MMHG | SYSTOLIC BLOOD PRESSURE: 128 MMHG | HEIGHT: 70 IN

## 2024-12-09 DIAGNOSIS — M81.0 OSTEOPOROSIS, UNSPECIFIED OSTEOPOROSIS TYPE, UNSPECIFIED PATHOLOGICAL FRACTURE PRESENCE: ICD-10-CM

## 2024-12-09 PROCEDURE — 2500000004 HC RX 250 GENERAL PHARMACY W/ HCPCS (ALT 636 FOR OP/ED): Performed by: INTERNAL MEDICINE

## 2024-12-09 PROCEDURE — 96372 THER/PROPH/DIAG INJ SC/IM: CPT

## 2024-12-09 RX ORDER — ALBUTEROL SULFATE 0.83 MG/ML
3 SOLUTION RESPIRATORY (INHALATION) AS NEEDED
OUTPATIENT
Start: 2025-06-05

## 2024-12-09 RX ORDER — FAMOTIDINE 10 MG/ML
20 INJECTION INTRAVENOUS ONCE AS NEEDED
OUTPATIENT
Start: 2025-06-05

## 2024-12-09 RX ORDER — EPINEPHRINE 0.3 MG/.3ML
0.3 INJECTION SUBCUTANEOUS EVERY 5 MIN PRN
OUTPATIENT
Start: 2025-06-05

## 2024-12-09 RX ORDER — DIPHENHYDRAMINE HYDROCHLORIDE 50 MG/ML
50 INJECTION INTRAMUSCULAR; INTRAVENOUS AS NEEDED
OUTPATIENT
Start: 2025-06-05

## 2024-12-09 ASSESSMENT — PATIENT HEALTH QUESTIONNAIRE - PHQ9
SUM OF ALL RESPONSES TO PHQ9 QUESTIONS 1 & 2: 0
1. LITTLE INTEREST OR PLEASURE IN DOING THINGS: NOT AT ALL
2. FEELING DOWN, DEPRESSED OR HOPELESS: NOT AT ALL

## 2024-12-09 ASSESSMENT — PAIN SCALES - GENERAL: PAINLEVEL_OUTOF10: 0-NO PAIN

## 2024-12-13 ENCOUNTER — TELEPHONE (OUTPATIENT)
Dept: RHEUMATOLOGY | Facility: CLINIC | Age: 79
End: 2024-12-13
Payer: COMMERCIAL

## 2024-12-13 DIAGNOSIS — M81.0 AGE-RELATED OSTEOPOROSIS WITHOUT CURRENT PATHOLOGICAL FRACTURE: Primary | ICD-10-CM

## 2024-12-13 NOTE — TELEPHONE ENCOUNTER
I spoke with the patients wife and got him and his wife scheduled for a follow up. They also agreed for the pt to get an updated bone density scan

## 2025-01-13 ENCOUNTER — HOSPITAL ENCOUNTER (OUTPATIENT)
Dept: RADIOLOGY | Facility: HOSPITAL | Age: 80
Discharge: HOME | End: 2025-01-13
Payer: MEDICARE

## 2025-01-13 DIAGNOSIS — M81.0 AGE-RELATED OSTEOPOROSIS WITHOUT CURRENT PATHOLOGICAL FRACTURE: ICD-10-CM

## 2025-01-13 PROCEDURE — 77080 DXA BONE DENSITY AXIAL: CPT

## 2025-01-13 PROCEDURE — 77080 DXA BONE DENSITY AXIAL: CPT | Performed by: RADIOLOGY

## 2025-01-13 NOTE — PROGRESS NOTES
Subjective   HPI   Whitley Tom is a 79 y.o. year old male patient with presenting to clinic with concern for Medicare Visit     Chief Complaint   Patient presents with    Welcome To Medicare       Labs- due Jan 2025- [facility]    No meds for BP  BP Readings from Last 5 Encounters:   01/14/25 126/78   12/09/24 128/81   07/18/24 110/70   06/10/24 129/66   04/30/24 146/69     HLD  No meds     Sinus surgery  Dr. Gil  sinus surgery July 2023. Cleaned out broken septum has ENT follow up  Ongoing drainage      Osteoporosis  Dr Becker [facility]  -prolia  DEXA performed yesterday, awaiting results    Cataract surgery Dr Luís Guzman O.D.  Had both eyes done, doing well. Only wearing reader glasses.         Patient Care Team:  Gloria Song PA-C as PCP - General (Family Medicine)     Specialists    Past Medical, Surgical, and Family History reviewed and updated in chart.    Reviewed all medications by prescribing practitioner or clinical pharmacist (such as prescriptions, OTCs, herbal therapies and supplements) and documented in the medical record.     Preventative Health   Health Maintenance Due   Topic Date Due    Lipid (cholesterol) test  Never done    Hepatitis C Screening  Never done    DTaP/Tdap/Td Vaccines (1 - Tdap) 12/27/2018    Influenza Vaccine (1) Never done            Topic Date Due    DTaP/Tdap/Td Vaccines (1 - Tdap) 12/27/2018        Immunizations Reviewed  Immunization History   Administered Date(s) Administered    Pneumococcal conjugate vaccine, 13-valent (PREVNAR 13) 03/15/2021    Pneumococcal polysaccharide vaccine, 23-valent, age 2 years and older (PNEUMOVAX 23) 02/11/2022    Td vaccine, age 7 years and older (TDVAX) 12/26/2018        Problem List Reviewed  Patient Active Problem List   Diagnosis    Allergic rhinitis    Chronic fatigue    Dyslipidemia    Osteopenia    Vitamin D deficiency    Otitis media with effusion, right    Disorder of left eustachian tube    Myringotomy tube status     Osteoporosis    Chronic allergic bronchitis ([facility])       Past Medical History:   Diagnosis Date    Abnormal EKG 07/17/2023    [place]ident [place]     right arm Auger accident    Bilateral impacted cerumen 07/17/2023    Chest injury     kicked in midchest by a horse, [facility] at [facility]    Diarrhea, unspecified 11/03/2020    Diarrhea of presumed infectious origin    Fall 2011    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, TBI- SubArachnoid hemorrhage, hemothorax,  R temporal bone fx , L1 fx    Pneumonia 07/17/2023    Postnasal discharge 07/17/2023      Past Surgical History:   Procedure Laterality Date    OTHER SURGICAL HISTORY Left 03/15/2021    Knee surgery      Family History   Problem Relation Name Age of Onset    Heart disease Mother      Hypertension Father      No Known Problems Other        Social History     Tobacco Use    Smoking status: Never    Smokeless tobacco: Never   Substance Use Topics    Alcohol use: Yes     Comment: rare/wine/beer        Medications Reviewed  Current Outpatient Medications on File Prior to Visit   Medication Sig Dispense Refill    azelastine (Astelin) 137 mcg (0.1 %) nasal spray Use 2 sprays in each nostril once a day 90 mL 3    calcium carbonate (Tums) 200 mg calcium chewable tablet Chew 1 tablet (500 mg) once daily. OTC For osteopenia calcium supplementation      cholecalciferol (Vitamin D-3) 50 MCG (2000 UT) tablet Take 1 tablet (50 mcg) by mouth once daily. OTC      fluticasone (Flonase) 50 mcg/actuation nasal spray Administer 2 sprays into each nostril once daily. Shake gently. Before first use, prime pump. After use, clean tip and replace cap. 48 g 3    nebulizer and compressor (EasyAir Compressor Nebulizer) device 1 Units 4 times a day. 90 each 0    nebulizers misc 1 Device 4 times a day as needed (use nebulizer with solution 4 times daily for wheezing or shortness of breath). 1 each 0     No current facility-administered medications on file prior to visit.        Review of Systems  Constitutional:  "Denies fever  HEENT: Denies ST, earache  CVS: Denies Chest pain  Pulmonary: Denies wheezing, SOB  GI: Denies N/V  : Denies dysuria  Musculoskeletal:  Denies myalgia  Neuro: Denies focal weakness or numbness.  Skin: Denies Rashes.  *Review of Systems is negative unless otherwise mentioned in HPI or ROS above.      @OBJECTIVEBEGIN  /78   Pulse 76   Ht 1.765 m (5' 9.5\")   Wt 82.8 kg (182 lb 8 oz)   SpO2 98%   BMI 26.56 kg/m²  reviewed Body mass index is 26.56 kg/m².     Physical Exam  Constitutional: NAD. Afebrile. Resting comfortably.  ENT: Nasal mucosa and oropharynx: moist oral mucosa. Posterior oropharynx nonerythematous. No posterior pharyngeal streaking.  Eyes: PERRLA. EOM intact. No vertical or circular nystagmus.  Lymph: No anterior cervical chain or submandibular lymphadenopathy. No sentinel lymph nodes.  Cardiac: Regular rate & rhythm. No murmur, gallops, or rubs.  Pulmonary: Lungs clear to auscultation bilaterally with good aeration. No wheezes, rhonchi, or rales. Normal WOB.  GI: Soft, Nontender, nondistended. No guarding. Normal BS x4.  : No suprapubic tenderness. No CVA tenderness to percussion.   Musculoskeletal: No peripheral edema.   Skin: No evidence of trauma. No rashes  Neuro: No focal neuro deficits. Normal gait without assistive devices.  Psych: Intact judgement and insight.    MDM        .Assessment/Plan   Problem List Items Addressed This Visit             ICD-10-CM    Chronic allergic bronchitis ([facility]) J45.998     Other Visit Diagnoses         Codes    Welcome to Medicare preventive visit    -  Primary Z00.00    Relevant Orders    CBC    TSH with reflex to Free T4 if abnormal    Lipid Panel    Prostate Specific Antigen, Screen    Borderline hyperlipidemia     E78.5    Relevant Orders    CBC    TSH with reflex to Free T4 if abnormal    Lipid Panel    Screening for malignant neoplasm of prostate     Z12.5    Relevant Orders    Prostate Specific Antigen, Screen            "

## 2025-01-14 ENCOUNTER — APPOINTMENT (OUTPATIENT)
Dept: PRIMARY CARE | Facility: CLINIC | Age: 80
End: 2025-01-14
Payer: COMMERCIAL

## 2025-01-14 VITALS
OXYGEN SATURATION: 98 % | HEART RATE: 76 BPM | BODY MASS INDEX: 26.13 KG/M2 | DIASTOLIC BLOOD PRESSURE: 78 MMHG | WEIGHT: 182.5 LBS | SYSTOLIC BLOOD PRESSURE: 126 MMHG | HEIGHT: 70 IN

## 2025-01-14 DIAGNOSIS — E78.5 BORDERLINE HYPERLIPIDEMIA: ICD-10-CM

## 2025-01-14 DIAGNOSIS — J45.998 CHRONIC ALLERGIC BRONCHITIS (HHS-HCC): ICD-10-CM

## 2025-01-14 DIAGNOSIS — Z12.5 SCREENING FOR MALIGNANT NEOPLASM OF PROSTATE: ICD-10-CM

## 2025-01-14 DIAGNOSIS — Z00.00 WELCOME TO MEDICARE PREVENTIVE VISIT: Primary | ICD-10-CM

## 2025-01-14 PROCEDURE — 1124F ACP DISCUSS-NO DSCNMKR DOCD: CPT | Performed by: PHYSICIAN ASSISTANT

## 2025-01-14 PROCEDURE — 1160F RVW MEDS BY RX/DR IN RCRD: CPT | Performed by: PHYSICIAN ASSISTANT

## 2025-01-14 PROCEDURE — 1036F TOBACCO NON-USER: CPT | Performed by: PHYSICIAN ASSISTANT

## 2025-01-14 PROCEDURE — 1159F MED LIST DOCD IN RCRD: CPT | Performed by: PHYSICIAN ASSISTANT

## 2025-01-14 PROCEDURE — 1170F FXNL STATUS ASSESSED: CPT | Performed by: PHYSICIAN ASSISTANT

## 2025-01-14 PROCEDURE — G0402 INITIAL PREVENTIVE EXAM: HCPCS | Performed by: PHYSICIAN ASSISTANT

## 2025-01-14 ASSESSMENT — ENCOUNTER SYMPTOMS
DEPRESSION: 0
OCCASIONAL FEELINGS OF UNSTEADINESS: 0
LOSS OF SENSATION IN FEET: 0

## 2025-01-14 ASSESSMENT — ACTIVITIES OF DAILY LIVING (ADL)
GROCERY_SHOPPING: INDEPENDENT
DRESSING: INDEPENDENT
MANAGING_FINANCES: INDEPENDENT
BATHING: INDEPENDENT
DOING_HOUSEWORK: INDEPENDENT
TAKING_MEDICATION: INDEPENDENT

## 2025-01-31 ENCOUNTER — APPOINTMENT (OUTPATIENT)
Facility: CLINIC | Age: 80
End: 2025-01-31
Payer: COMMERCIAL

## 2025-01-31 VITALS
BODY MASS INDEX: 26.84 KG/M2 | WEIGHT: 184.4 LBS | OXYGEN SATURATION: 95 % | DIASTOLIC BLOOD PRESSURE: 75 MMHG | HEART RATE: 73 BPM | SYSTOLIC BLOOD PRESSURE: 132 MMHG

## 2025-01-31 DIAGNOSIS — Z79.899 HIGH RISK MEDICATION USE: ICD-10-CM

## 2025-01-31 DIAGNOSIS — M81.0 AGE-RELATED OSTEOPOROSIS WITHOUT CURRENT PATHOLOGICAL FRACTURE: Primary | ICD-10-CM

## 2025-01-31 PROCEDURE — 99214 OFFICE O/P EST MOD 30 MIN: CPT | Performed by: INTERNAL MEDICINE

## 2025-01-31 PROCEDURE — 1159F MED LIST DOCD IN RCRD: CPT | Performed by: INTERNAL MEDICINE

## 2025-01-31 ASSESSMENT — ENCOUNTER SYMPTOMS
OCCASIONAL FEELINGS OF UNSTEADINESS: 0
DEPRESSION: 0
LOSS OF SENSATION IN FEET: 0

## 2025-01-31 NOTE — PROGRESS NOTES
Subjective   Patient ID: [id number]   Whitley Tom is a 79 y.o. male who presents for Follow-up.  HPI    PMH: septoplasty and sinus surgery 07/2023, osteopenia, L TKA 8-9 years ago  FHx: No family history of autoimmune diseases   Social: Nonsmoker, beer ~ daily, no drug use. Deb-retired, works on commercial farm     Recall:  He is transferring care from Dr Shaikh. He has osteopenia with high frax.   DEXA 01/2023  Spine T score 0.6, BMD 1.16, L hip total T score-1.0, BMD 0.877, L hip neck T score -1.2, BMD 0.76  FRAX hip 6%, major OP 11  Father with hip fracture at 93. Mother with hip fracture at 88  Compression fracture of his vertebra T4-T5, and skull fracture after 10 ft fall 12-13 years ago. No fragility fractures.   He drinks ~1 beer daily.     Current meds:  Prolia (12/2023)  Vitamin D-3 5000 international units  Calcium 1000mg daily    History of falls / fractures: no  Loss of height: no  Tobacco: no  Alcohol: no, less than 1 drink daily   Vitamin D supplementation: yes  Calcium supplementation: yes  Weight bearing exercise: yes  Previous or planned invasive jaw surgery: no  History of radiation: no  Chronic steroid use: no  History of RA: no  GERD: no  Chris-en-y: no  CKD / GFR <30: no  Parental hip fracture: yes     No side effects to prolia      Patient Active Problem List   Diagnosis    Allergic rhinitis    Chronic fatigue    Dyslipidemia    Osteopenia    Vitamin D deficiency    Otitis media with effusion, right    Disorder of left eustachian tube    Myringotomy tube status    Osteoporosis    Chronic allergic bronchitis ([facility])        Past Medical History:   Diagnosis Date    Abnormal EKG 07/17/2023    Accident on farm     right arm Auger accident    Bilateral impacted cerumen 07/17/2023    Chest injury     kicked in midchest by a horse, observed at [facility], unspecified 11/03/2020    Diarrhea of presumed infectious origin    Fall 2011    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, TBI- SubArachnoid hemorrhage,  hemothorax,  R temporal bone fx , L1 fx    Pneumonia 07/17/2023    Postnasal discharge 07/17/2023        Past Surgical History:   Procedure Laterality Date    OTHER SURGICAL HISTORY Left 03/15/2021    Knee surgery        Social History     Socioeconomic History    Marital status:      Spouse name: Not on file    Number of children: Not on file    Years of education: Not on file    Highest education level: Not on file   Occupational History    Not on file   Tobacco Use    Smoking status: Never    Smokeless tobacco: Never   Substance and Sexual Activity    Alcohol use: Yes     Comment: rare/wine/beer    Drug use: Never    Sexual activity: Not on file   Other Topics Concern    Not on file   Social History Narrative    Not on file     Social Drivers of Health     Financial Resource Strain: Not on file   Food Insecurity: Not on file   Transportation Needs: Not on file   Physical Activity: Not on file   Stress: No Stress Concern Present (12/9/2024)    [employer] [place] [employer] - Occupational Stress Questionnaire     Feeling of Stress : Not at all   Social Connections: Not on file   Intimate Partner Violence: Not on file   Housing Stability: Not on file        Allergies   Allergen Reactions    Oxycodone Unknown          Current Outpatient Medications:     azelastine (Astelin) 137 mcg (0.1 %) nasal spray, Use 2 sprays in each nostril once a day, Disp: 90 mL, Rfl: 3    calcium carbonate (Tums) 200 mg calcium chewable tablet, Chew 1 tablet (500 mg) once daily. OTC For osteopenia calcium supplementation, Disp: , Rfl:     cholecalciferol (Vitamin D-3) 50 MCG (2000 UT) tablet, Take 1 tablet (50 mcg) by mouth once daily. OTC, Disp: , Rfl:     nebulizer and compressor (EasyAir Compressor Nebulizer) device, 1 Units 4 times a day., Disp: 90 each, Rfl: 0    nebulizers misc, 1 Device 4 times a day as needed (use nebulizer with solution 4 times daily for wheezing or shortness of breath)., Disp: 1 each, Rfl: 0     "fluticasone (Flonase) 50 mcg/actuation nasal spray, Administer 2 sprays into each nostril once daily. Shake gently. Before first use, prime pump. After use, clean tip and replace cap., Disp: 48 g, Rfl: 3       Objective     Visit Vitals  /75 (BP Location: Left arm, Patient Position: Sitting)   Pulse 73        Physical Exam  General: AAOx3, Cooperative  Head: normocephalic, atraumatic  Throat/Mouth: No oral deformities, no cheek swelling, mucosa appear moist, no oral ulcers noted or loss of dentition   Neuro: CN II-XII grossly intact, no focal deficit  Skin: No rashes, ulcers or photosensitive areas  MSK: no synovitis of upper or lower extremities. L knee TKA scar.          Lab Results   Component Value Date    WBC 6.8 10/31/2020    HGB 14.4 10/31/2020    HCT 42.0 10/31/2020    MCV 99 10/31/2020     (L) 10/31/2020        Chemistry    Lab Results   Component Value Date/Time     06/10/2024 0832    K 3.7 06/10/2024 0832     06/10/2024 0832    CO2 23 06/10/2024 0832    BUN 17 06/10/2024 0832    CREATININE 1.12 06/10/2024 0832    Lab Results   Component Value Date/Time    CALCIUM 9.2 06/10/2024 0832    ALKPHOS 68 06/10/2024 0832    AST 14 06/10/2024 0832    ALT 13 06/10/2024 0832    BILITOT 1.1 06/10/2024 0832           Lab Results   Component Value Date    CRP 1.94 (A) 10/31/2020      No results found for: \"HAILY\", \"RF\", \"SEDRATE\"   No results found for: \"CKTOTAL\"  Lab Results   Component Value Date    NEUTROABS 4.80 10/29/2020      Lab Results   Component Value Date    FERRITIN 836 (H) 10/30/2020      No results found for: \"HEPATOT\", \"HEPAIGM\", \"HEPBCIGM\", \"HEPBCAB\", \"HBEAG\", \"HEPCAB\"   Lab Results   Component Value Date    ALT 13 06/10/2024    AST 14 06/10/2024    ALKPHOS 68 06/10/2024    BILITOT 1.1 06/10/2024      No results found for: \"PPD\"   No results found for: \"URICACID\"   Lab Results   Component Value Date    CALCIUM 9.2 06/10/2024      No results found for: \"SPEP\", \"UPEP\"   No results " "found for: \"ALBUR\", \"[id number]\"   .last          XR DEXA bone density  Narrative: Interpreted By:  Tai Thurman,   STUDY:  DEXA BONE DENSITY1/13/2025 8:46 am      INDICATION:  Signs/Symptoms:follow up of osteoporosis. The patient is a 80 y/o  year old M.      ,M81.0 Age-related osteoporosis without current pathological fracture      COMPARISON:  Lumbar spine 01/23/2023      ACCESSION NUMBER(S):  [id number]      ORDERING CLINICIAN:  ANTONIETA NEWTON      TECHNIQUE:  DEXA BONE DENSITY      FINDINGS:  SPINE L1-L4  Bone Mineral Density: 1.291 g/cm2  T-Score 1.8  Z-Score 2.9  Bone Mineral Density change vs baseline:  11.1 %  Bone Mineral Density change vs previous: 11.1 %      LEFT FEMUR -TOTAL  Bone Mineral Density: 0.984 g/cm2  T-Score -0.3   Z-Score  0.6  Bone Mineral Density change vs baseline: 12.2 %  Bone Mineral Density change vs previous: 12.2 %      LEFT FEMUR -NECK  Bone Mineral Density: 0.804 g/cm2  T-Score -0.9  Z-Score 0.5  Bone Mineral Density change vs baseline: Not reported  Bone Mineral Density change vs previous: Not reported          World Health Organization (WHO) criteria for post-menopausal,   Women:  Normal:         T-score at or above -1 SD  Osteopenia:   T-score between -1 and -2.5 SD  Osteoporosis: T-score at or below -2.5 SD          10-year Fracture Risk:  Major Osteoporotic Fracture  6.0 %  Hip Fracture                        1.6 %      Note:  If no FRAX score is reported, it is because:  Some T-score for Spine Total or Hip Total or Femoral Neck at or below  -2.5      Impression: DEXA:  Examination demonstrates normal mineralization lumbar spine  and left hip.      All images and detailed analysis are available on the  Radiology  PACS.      MACRO:  None      Signed by: Tai Thurman 1/13/2025 10:48 AM  Dictation workstation:   [id number]     === 03/26/24 ===    XR CHEST 2 VIEWS    - Impression -  No evidence of acute intrathoracic abnormality.    Signed by: Juan Apodaca 3/27/2024 " 5:55 PM  Dictation workstation:   [id number]      === 01/23/23 ===    DEXA BONE DENSITY AXIAL SKELETON W VFA       Assessment/Plan   A 79 year old M here for follow up of osteoporosis.     Today I reviewed labs from 12/2024 and DEXA from 01/2025 with the patient.     DEXA with improvement. T scores are currently in the normal range.     Transitioning to an oral BP was discussed, as this approach helps mitigate the risk of rapid bone loss upon stopping Prolia.  However, patient prefers to continue Prolia until the next DEXA rather than transitioning to BP at this time.     - continue prolia q 6 months  - continue calcium and vitamin D   - DEXA due 01/2027 and then will stop prolia and switch to BP.  - preprolia occurring labs re-ordered     RTC in 1 year      Zelda Becker MD   Department of Rheumatology

## 2025-05-02 ENCOUNTER — TELEPHONE (OUTPATIENT)
Facility: CLINIC | Age: 80
End: 2025-05-02
Payer: MEDICARE

## 2025-05-02 DIAGNOSIS — M81.0 OSTEOPOROSIS, UNSPECIFIED OSTEOPOROSIS TYPE, UNSPECIFIED PATHOLOGICAL FRACTURE PRESENCE: Primary | ICD-10-CM

## 2025-06-07 LAB
25(OH)D3+25(OH)D2 SERPL-MCNC: 82 NG/ML (ref 30–100)
ALBUMIN SERPL-MCNC: 4.1 G/DL (ref 3.6–5.1)
ALP SERPL-CCNC: 69 U/L (ref 35–144)
ALT SERPL-CCNC: 12 U/L (ref 9–46)
ANION GAP SERPL CALCULATED.4IONS-SCNC: 14 MMOL/L (CALC) (ref 7–17)
AST SERPL-CCNC: 16 U/L (ref 10–35)
BILIRUB SERPL-MCNC: 1.1 MG/DL (ref 0.2–1.2)
BUN SERPL-MCNC: 17 MG/DL (ref 7–25)
CALCIUM SERPL-MCNC: 9.5 MG/DL (ref 8.6–10.3)
CHLORIDE SERPL-SCNC: 104 MMOL/L (ref 98–110)
CO2 SERPL-SCNC: 19 MMOL/L (ref 20–32)
CREAT SERPL-MCNC: 1.11 MG/DL (ref 0.7–1.28)
EGFRCR SERPLBLD CKD-EPI 2021: 68 ML/MIN/1.73M2
GLUCOSE SERPL-MCNC: 88 MG/DL (ref 65–99)
POTASSIUM SERPL-SCNC: 4.1 MMOL/L (ref 3.5–5.3)
PROT SERPL-MCNC: 7.3 G/DL (ref 6.1–8.1)
SODIUM SERPL-SCNC: 137 MMOL/L (ref 135–146)

## 2025-06-09 ENCOUNTER — INFUSION (OUTPATIENT)
Dept: HEMATOLOGY/ONCOLOGY | Facility: HOSPITAL | Age: 80
End: 2025-06-09
Payer: MEDICARE

## 2025-06-09 VITALS
OXYGEN SATURATION: 97 % | DIASTOLIC BLOOD PRESSURE: 78 MMHG | HEIGHT: 69 IN | HEART RATE: 81 BPM | BODY MASS INDEX: 27.82 KG/M2 | WEIGHT: 187.83 LBS | SYSTOLIC BLOOD PRESSURE: 122 MMHG | TEMPERATURE: 96.6 F | RESPIRATION RATE: 16 BRPM

## 2025-06-09 DIAGNOSIS — M81.0 OSTEOPOROSIS, UNSPECIFIED OSTEOPOROSIS TYPE, UNSPECIFIED PATHOLOGICAL FRACTURE PRESENCE: ICD-10-CM

## 2025-06-09 PROCEDURE — 96372 THER/PROPH/DIAG INJ SC/IM: CPT

## 2025-06-09 PROCEDURE — 2500000004 HC RX 250 GENERAL PHARMACY W/ HCPCS (ALT 636 FOR OP/ED): Mod: JZ,TB | Performed by: INTERNAL MEDICINE

## 2025-06-09 RX ORDER — DIPHENHYDRAMINE HYDROCHLORIDE 50 MG/ML
50 INJECTION, SOLUTION INTRAMUSCULAR; INTRAVENOUS AS NEEDED
OUTPATIENT
Start: 2025-12-02

## 2025-06-09 RX ORDER — EPINEPHRINE 0.3 MG/.3ML
0.3 INJECTION SUBCUTANEOUS EVERY 5 MIN PRN
OUTPATIENT
Start: 2025-12-02

## 2025-06-09 RX ORDER — ALBUTEROL SULFATE 0.83 MG/ML
3 SOLUTION RESPIRATORY (INHALATION) AS NEEDED
OUTPATIENT
Start: 2025-12-02

## 2025-06-09 RX ORDER — FAMOTIDINE 10 MG/ML
20 INJECTION, SOLUTION INTRAVENOUS ONCE AS NEEDED
OUTPATIENT
Start: 2025-12-02

## 2025-06-09 RX ADMIN — DENOSUMAB 60 MG: 60 INJECTION SUBCUTANEOUS at 08:36

## 2025-06-09 ASSESSMENT — COLUMBIA-SUICIDE SEVERITY RATING SCALE - C-SSRS
6. HAVE YOU EVER DONE ANYTHING, STARTED TO DO ANYTHING, OR PREPARED TO DO ANYTHING TO END YOUR LIFE?: NO
2. HAVE YOU ACTUALLY HAD ANY THOUGHTS OF KILLING YOURSELF?: NO
1. IN THE PAST MONTH, HAVE YOU WISHED YOU WERE DEAD OR WISHED YOU COULD GO TO SLEEP AND NOT WAKE UP?: NO

## 2025-06-09 ASSESSMENT — ENCOUNTER SYMPTOMS
LOSS OF SENSATION IN FEET: 0
DEPRESSION: 0
OCCASIONAL FEELINGS OF UNSTEADINESS: 0

## 2025-06-09 ASSESSMENT — PAIN SCALES - GENERAL: PAINLEVEL_OUTOF10: 0-NO PAIN

## 2025-06-27 ENCOUNTER — APPOINTMENT (OUTPATIENT)
Dept: OTOLARYNGOLOGY | Facility: CLINIC | Age: 80
End: 2025-06-27
Payer: COMMERCIAL

## 2025-06-27 DIAGNOSIS — J30.9 ALLERGIC RHINITIS, UNSPECIFIED SEASONALITY, UNSPECIFIED TRIGGER: ICD-10-CM

## 2025-06-27 DIAGNOSIS — H91.93 DECREASED HEARING OF BOTH EARS: Primary | ICD-10-CM

## 2025-06-27 PROCEDURE — 99213 OFFICE O/P EST LOW 20 MIN: CPT | Performed by: OTOLARYNGOLOGY

## 2025-06-27 PROCEDURE — 1159F MED LIST DOCD IN RCRD: CPT | Performed by: OTOLARYNGOLOGY

## 2025-06-27 PROCEDURE — 1036F TOBACCO NON-USER: CPT | Performed by: OTOLARYNGOLOGY

## 2025-06-27 RX ORDER — AZELASTINE 1 MG/ML
SPRAY, METERED NASAL
Qty: 90 ML | Refills: 3 | Status: SHIPPED | OUTPATIENT
Start: 2025-06-27

## 2025-06-27 RX ORDER — FLUTICASONE PROPIONATE 50 MCG
2 SPRAY, SUSPENSION (ML) NASAL DAILY
Qty: 48 G | Refills: 3 | Status: SHIPPED | OUTPATIENT
Start: 2025-06-27 | End: 2025-09-25

## 2025-06-27 NOTE — PROGRESS NOTES
Subjective   Patient ID: Whitley Tom is a 80 y.o. male who presents for follow up for ear and nose-sinus issues.    History of Present Illness    06.27.2025. Most of the time he is able to breathe well out of his nose. Decreased hearing (+) in left ear.     On examination TMs look intact. Possible effusion (+) at right.  Nasal septum deviated to left. No visible postnasal discharge.    Plan:  1- hearing test  ______________________________________________________________________    06.28.2024: He comes for follow up. Ears mostly feel open. Breathing most of the time no problems. He still has postnasal discharge. Off and on he coughs, but overall it is better. He uses nasal sprays.     On examination, right TM is retracted and right middle ear has effusion. Left TM looks intact, no effusion. Nasal passages look open. Residual deviation to left.     Plan:  1-follow-up in 1 year  2-continue nasal sprays  Consider T-tube for right ear again, I tried it in the past, but could not place., TM severely retracted. It could be combined with myringoplasty/tympanoplasty.    _________________________________________________________________    05.28.2024: Fluid feeling in left ear is better. Right ear feels clear. He feels like his sinuses are draining pretty bad. Postnasal discharge (+). He coughs to move it. His cough is overall better.     On examination, right TM is severely retracted, effusion (+). Both TMs look intact. Left nasal passage looks tight compared to right side. Left inferior turbinate's posterior part is hypertrophic. Residual deviation of nasal septum to left (+). Mucopurulent discharge was (+) in left middle meatus. Right middle meatus looks open and clear.    Plan:  1- oral generic augmentin tab  2- fluticasone nasal spray  3- azelastine nasal spray  4- follow up in one month  _________________________________________________________________    04.01.2024: He feels like there  is fluid in his left ear,  he had septoplasty, and sinus surgery on 07.06.2023. He had a uri and has used 4 rounds of oral antibiotics.    On today's examination tube in right TM came out. Right TM looks adhesive retracted posteriorly. Left TM looks intact. I have not seen fluid on today's exam. He has bilateral type c tympanograms -196 dapa at left and -236 dapa at right.     Nasal endoscopy did not show any purulent discharge in nose. Nasal passages look open bilaterally.     He has dry cough going on for a while. There is a cough triggering point at subglottic - suprasternal area. Local steroid injection was done.    Plan:  1- follow up in May 2024, sooner if needed  _________________________________________________________________    11.27.2023: He had his hearing test today.  At left ear there is sloping type of hearing loss at high frequencies and mild slope at right ear at high and at low frequencies.  He feels like his hearing at left is not as good as his hearing at right.  Since most of his problem is sensorineural, I recommend to consider using a hearing aid for the left ear.  On examination, ventilation tube seems to be touching right middle ear mucosa, which may cause chronic inflammation over time.    Recommendations:  1-use eardrops once a week to suppress foreign body reaction at right ear  2-consider using a hearing aid for the left ear    _________________________________________________________________    10/13/2023: He comes for follow up. Nose and sinuses feel good. On examination nasal passages look open bilaterally. Tube in right TM, looks open. No granulation or discharge. Pinpoint opening at left TM posterosuperiorly.     Plan:  1- hearing test  2- follow up in 6 months    _________________________________________________________________    10/10/2023: No nose and sinus problems essentially. Left ear feels full. He recently had a slight cold.     On examination, right TM is severely retracted and there is fluid in  right middle ear. Left TM is intact.    Dx:  1- right otitis media with effusion  2- retraction of right TM  3- left ETD    Today he had insertion of right ventilation tube and left needle myringotomy with intratympanic steroid injection.    Plan:  1- ear drops  2- follow up in 2 weeks    _________________________________________________________________      08.22.2023: He had surgery on 07.06.2023.     Diagnosis:   1- deviated nasal septum,   2- michael bullosa (left),   3- chronic postnasal discharge,   4- bilateral maxillary sinusitis,   5- congestion of left inferior turbinate   6- retraction-adhesion of right tympanic membrane   7- right otitis media with effusion      Procedure:   1. septoplasty   2. reduction of left michael bullosa   3. radiofrequency reduction of inferior turbinates   4. partial ethmoidectomy   5. maxillary antrostomy   6. clarifix cryotherapy   7. right myringotomy      Findings: septum deviated to left anteriorly, deviated to right posteriorly, left michael bullosa, bilateral marcos cells, right maxillary antrum was closed. Retraction-adhesion of right TM, right OME.     On examination, there was crusting over the left inferior turbinate. Cleaning was done. Prominent left nasal septal swell body (+). There was mucopurulent discharge coming out of right middle meatus. Synechia in middle meatus bilaterally. Patient did not use the ointment and nasal rinse as instructed.     Recommendation:  1â€“Daily nasal rinse and antibiotic ointment  2â€“follow-up in 2 months, consider lyses of synechia, if symptomatic.           ____________________________________________________________________________________________        Mr. Tom is a 78 yo M. He comes for nose sinus issues and ear wax.  He has lots of drainage from his sinuses most of the time, mostly postnasal.   It tickles his throat and causes him to cough. This has been going on for decades.  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  He  "has been using flonase nasal spray for a long time.     History of ear tubes, years ago.  Difficulty with hearing, ears feel full.     On examination, there was wax in ears bilaterally. Cleaning was done. RIght TM is retracted and there is fluid (+). LEft TM looks essentially normal.      Nasal septum is deviated to left anteriorly and to right superiorly and posteriorly  No visible purulent postnasal discharge.      Dx:  1- Deviated nasal septum  2- RIght otitis media with effusion  3- wax build up bilateral (cleaned)     Plan:  1- septoplasty, RF turbinate reduction  2- insertion of right t -tube  3- hearing test  4- clarifix cryotherapy for postnasal thick mucoid discharge   _________________________________________________________________    04.30.2023: CT scan shows deviated nasal septum to left anteriorly and severely deviated nasal septum to right posteriorly. Congestion of right inferior turbinate, left michael bullosa, bilateral mucosal thickenings at maxillary sinuses.     Modified plan is septoplasty, reduction of left michael bullosa, RF inferior turbinate reduction, clarifix cryotherapy for postnasal discharge, limited sinus surgery 96022, [id number].      Chief Complaint     \"Suggested by Primary (ear wax) sinus\"      Adult Risk ScreeningAdult Risk Screening_UH: There are no spiritual/cultural practices/values/needs that are important to know   Initial Fall Risk Screening:   ZAHRA has not fallen in the last 6 months. His fall did not result in injury. ZAHRA does not have a fear of falling. He does not need assistance with sitting, standing or walking. Does not need assistance walking in his home. He does not need assistance in an unfamiliar setting. The patient is not using an assistive device.        Domestic Violence Screen: Does not feel threatened or abused physically, emotionally or sexually. Do you feel UNSAFE?   The patient feels safe in the home.   Depression/Suicide Screening:   During the past 2 " weeks, the patient has not felt down, depressed or hopeless.    During the past 2 weeks, the patient has not felt little interest or pleasure in doing things.       Review of Systems  History of rar fullness (+)    Objective   Physical Exam  (old exam note)     General appearance: Healthy-appearing, well-nourished, well groomed, in no acute distress.      Head and Face: Atraumatic with no masses, lesions, or scarring.      Salivary glands: No tenderness of the parotid glands or parotid masses.      No tenderness of the submandibular glands or submandibular masses.      Facial strength: Normal strength and symmetry, no synkinesis or facial tic.      Eyes: Conjunctivas look non-hyperemic bilaterally     Ears: Bilaterally ear canals look normal. Tympanic membranes intact, no hyperemia, fluid or retraction.      Nose: Mucosa looks normal. No purulent discharge. Septum deviated to left / right / essentially straight.     Oral Cavity/Mouth: Lips and tongue look normal.      Throat: No postnasal discharge. No tonsil hypertrophy. No hyperemia.     Neck: Symmetrical, trachea midline.      Pulmonary: Normal respiratory effort.      Lymphatic: No palpable pathologic lymph nodes at neck.      Neurological/Psychiatric: Orientation to person, place, and time: Normal.   Mood and affect: Normal.      Extremities: No clubbing.        Procedure    BILATERAL NEEDLE MYRINGOTOMY and INTRATYMPANIC STEROID INJECTION 04.01.2024    Operative microscope was brought to patient's left ear. Topical phenol was applied posteriorly, then myringotomy was done using 25 g needle and ~0.4 ml 10mg/ml dexamethasone was injected intratympanically. Antibiotic drops were applied.    Operative microscope was brought to patient's right ear. Topical phenol was applied posteriorly, then myringotomy was done using 27 g needle and ~0.2 ml 10mg/ml dexamethasone was injected intratympanically. Antibiotic drops were  applied.    _________________________________________________________________    INJECTION NOTE 04.01.2024  ~10 mg dexamethasone mixed with local anesthetics was injected to suprasternal, subglottic area towards the external branches of superior laryngeal nerve for subacute dry cough.    _________________________________________________________________    LEFT NEEDLE MYRINGOTOMY and INTRATYMPANIC STEROID INJECTION 10/10/2023  Operative microscope was brought to patient's left ear. Topical phenol was applied posteriorly, then myringotomy was done using 25 g needle and ~0.4 ml 10mg/ml dexamethasone was injected intratympanically. Antibiotic drops were applied.    RIGHT VENTILATION TUBE INSERTION 10/10/2023  Operative microscope was brought to patient's right ear. Topical phenol was applied anteroinferiorly, then myringotomy was done using 21 g needle. Fluid was suctioned. Brda bobbin ventilation tube was inserted. Antibiotic drops were applied.    Procedure was concluded.       Assessment/Plan     06.27.2025. Most of the time he is able to breathe well out of his nose. Decreased hearing (+) in left ear.     On examination TMs look intact. Possible effusion (+) at right.  Nasal septum deviated to left. No visible postnasal discharge.    Plan:  1- hearing test  ______________________________________________________________________    06.28.2024: He comes for follow up. Ears mostly feel open. Breathing most of the time no problems. He still has postnasal discharge. Off and on he coughs, but overall it is better. He uses nasal sprays.     On examination, right TM is retracted and right middle ear has effusion. Left TM looks intact, no effusion. Nasal passages look open. Residual deviation to left.     Plan:  1-follow-up in 1 year  2-continue nasal sprays  Consider T-tube for right ear again, I tried it in the past, but could not place., TM severely retracted. It could be combined with  myringoplasty/tympanoplasty.    _________________________________________________________________    05.28.2024: Fluid feeling in left ear is better. Right ear feels clear. He feels like his sinuses are draining pretty bad. Postnasal discharge (+). He coughs to move it. His cough is overall better.     On examination, right TM is severely retracted, effusion (+). Both TMs look intact. Left nasal passage looks tight compared to right side. Left inferior turbinate's posterior part is hypertrophic. Residual deviation of nasal septum to left (+). Mucopurulent discharge was (+) in left middle meatus. Right middle meatus looks open and clear.    Plan:  1- oral generic augmentin tab  2- fluticasone nasal spray  3- azelastine nasal spray  4- follow up in one month  _________________________________________________________________    04.01.2024: He feels like there  is fluid in his left ear, he had septoplasty, and sinus surgery on 07.06.2023. He had a uri and has used 4 rounds of oral antibiotics.    On today's examination tube in right TM came out. Right TM looks adhesive retracted posteriorly. Left TM looks intact. I have not seen fluid on today's exam. He has bilateral type c tympanograms -196 dapa at left and -236 dapa at right.     Nasal endoscopy did not show any purulent discharge in nose. Nasal passages look open bilaterally.     He has dry cough going on for a while. There is a cough triggering point at subglottic - suprasternal area. Local steroid injection was done.    Plan:  1- follow up in May 2024, sooner if needed  _________________________________________________________________    11.27.2023: He had his hearing test today.  At left ear there is sloping type of hearing loss at high frequencies and mild slope at right ear at high and at low frequencies.  He feels like his hearing at left is not good good as his hearing at right.  Since most of his problem is sensorineural, I recommend to consider using a  hearing aid for the left ear.  On examination, ventilation tube seems to be touching right middle ear mucosa, which may cause chronic inflammation over time.    Recommendations:  1-use eardrops once a week to suppress foreign body reaction at right ear  2-consider using a hearing aid for the left ear    _________________________________________________________________    10/13/2023: He comes for follow up. Nose and sinuses feel good. On examination nasal passages look open bilaterally. Tube in right TM, looks open. No granulation or discharge. Pinpoint opening at left TM posterosuperiorly.     Plan:  1- hearing test  2- follow up in 6 months    _________________________________________________________________    10/10/2023: No nose and sinus problems essentially. Left ear feels full. He recently had a slight cold.     On examination, right TM is severely retracted and there is fluid in right middle ear. Left TM is intact.    Dx:  1- right otitis media with effusion  2- retraction of right TM  3- left ETD    Today he had insertion of right ventilation tube and left needle myringotomy with intratympanic steroid injection.    Plan:  1- ear drops  2- follow up in 2 weeks

## 2025-07-08 ENCOUNTER — TELEPHONE (OUTPATIENT)
Dept: PRIMARY CARE | Facility: CLINIC | Age: 80
End: 2025-07-08
Payer: MEDICARE

## 2025-07-08 DIAGNOSIS — Z79.2 NEED FOR ANTIBIOTIC PROPHYLAXIS FOR DENTAL PROCEDURE: Primary | ICD-10-CM

## 2025-07-08 RX ORDER — PENICILLIN V POTASSIUM 500 MG/1
500 TABLET, FILM COATED ORAL
Qty: 3 TABLET | Refills: 0 | Status: SHIPPED | OUTPATIENT
Start: 2025-07-08 | End: 2025-07-08

## 2025-07-08 RX ORDER — PENICILLIN V POTASSIUM 500 MG/1
500 TABLET, FILM COATED ORAL
COMMUNITY
Start: 2025-01-09 | End: 2025-07-08 | Stop reason: SDUPTHER

## 2025-07-08 NOTE — TELEPHONE ENCOUNTER
Wife Cammie called requesting Penicillin VK specifically per ortho Dr Murillo (not amoxicillin) for dental work upcoming next week. (Confirmed this in EMR med list) Pt has TKR.

## 2025-07-17 ENCOUNTER — APPOINTMENT (OUTPATIENT)
Dept: PRIMARY CARE | Facility: CLINIC | Age: 80
End: 2025-07-17
Payer: MEDICARE

## 2025-07-18 DIAGNOSIS — Z79.899 HIGH RISK MEDICATION USE: ICD-10-CM

## 2025-07-18 DIAGNOSIS — M81.0 AGE-RELATED OSTEOPOROSIS WITHOUT CURRENT PATHOLOGICAL FRACTURE: ICD-10-CM

## 2025-08-05 ENCOUNTER — APPOINTMENT (OUTPATIENT)
Dept: OTOLARYNGOLOGY | Facility: CLINIC | Age: 80
End: 2025-08-05
Payer: MEDICARE

## 2025-08-05 ENCOUNTER — APPOINTMENT (OUTPATIENT)
Dept: AUDIOLOGY | Facility: CLINIC | Age: 80
End: 2025-08-05
Payer: MEDICARE

## 2025-08-05 DIAGNOSIS — H90.A22 SENSORINEURAL HEARING LOSS (SNHL) OF LEFT EAR WITH RESTRICTED HEARING OF RIGHT EAR: ICD-10-CM

## 2025-08-05 DIAGNOSIS — H90.3 SENSORINEURAL HEARING LOSS (SNHL) OF BOTH EARS: ICD-10-CM

## 2025-08-05 DIAGNOSIS — H90.A31 MIXED CONDUCTIVE AND SENSORINEURAL HEARING LOSS OF RIGHT EAR WITH RESTRICTED HEARING OF LEFT EAR: ICD-10-CM

## 2025-08-05 DIAGNOSIS — R09.89 RUNNY NOSE: Primary | ICD-10-CM

## 2025-08-05 DIAGNOSIS — H69.92 TYPE C TYMPANOGRAM OF LEFT EAR: ICD-10-CM

## 2025-08-05 DIAGNOSIS — H91.93 DECREASED HEARING OF BOTH EARS: Primary | ICD-10-CM

## 2025-08-05 PROCEDURE — 92550 TYMPANOMETRY & REFLEX THRESH: CPT | Performed by: AUDIOLOGIST

## 2025-08-05 PROCEDURE — 99214 OFFICE O/P EST MOD 30 MIN: CPT | Performed by: OTOLARYNGOLOGY

## 2025-08-05 PROCEDURE — 1036F TOBACCO NON-USER: CPT | Performed by: OTOLARYNGOLOGY

## 2025-08-05 PROCEDURE — 1159F MED LIST DOCD IN RCRD: CPT | Performed by: OTOLARYNGOLOGY

## 2025-08-05 PROCEDURE — 92557 COMPREHENSIVE HEARING TEST: CPT | Performed by: AUDIOLOGIST

## 2025-08-05 RX ORDER — IPRATROPIUM BROMIDE 42 UG/1
SPRAY, METERED NASAL
Qty: 15 ML | Refills: 1 | Status: SHIPPED | OUTPATIENT
Start: 2025-08-05

## 2025-08-05 NOTE — PROGRESS NOTES
"  AUDIOLOGY   ADULT AUDIOMETRIC EVALUATION    Name:  Whitley Tom  :  1945 Age:  80 y.o.  Date of Evaluation:  2025    Reason for visit: Mr. Tom is seen in the clinic today at the request of Jesus Gil MD  in otolaryngology for an audiologic evaluation.     IMPRESSIONS   Essentially bilateral high frequency sensorineural hearing loss.  Conductive component at 1000Hz in   the RIGHT ear.  Asymmetry (previously noted) 3000Hz-6000Hz, LEFT worse than RIGHT   LEFT ear remains stable, slight decrease in RIGHT ear, compared to 23 evaluation  Tympanometry essentially within normal limits with slight negative pressure (-165daPa) in the LEFT ear.      HISTORY   Patient reports perceiving a fluctuating hearing loss.  He denies a perceived change in hearing sensitivity this date. 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 Patient denies Otalgia, Otorrhea, Tinnitus, Dizziness, History of surgery, and Other pertinent medical information this date.      EVALUATOIN   See scanned audiogram: “Media” > “Audiology Report”.  No images are attached to the encounter or orders placed in the encounter.    RESULTS   Otoscopic Evaluation:      Right Ear: Ear canal clear, Tympanic membrane visualized      Left Ear:  Ear canal clear, Tympanic membrane visualized    Immittance Measures:  Tympanometry:       Right Ear: Type As; Normal middle ear pressure, external auditory canal volume and reduced tympanic membrane compliance.        Left Ear: Type Ad/C; Negative middle ear pressure (-165daPa), normal external auditory canal volume and hyper compliant tympanic membrane.     Acoustic Reflexes:    Ipsilateral:       Right Ear: Present at, 500Hz, 1000Hz, 2000Hz, and 4000Hz       Left Ear: Present at, 500Hz, 1000Hz, 2000Hz, and 4000Hz    Contralateral:     "   Right Ear: Did not evaluate.       Left Ear: Did not evaluate.    The presence of acoustic reflexes within normal intensity limits is consistent with normal middle ear and brainstem function,   and suggests that auditory sensitivity is not significantly impaired. An elevated or absent acoustic reflex threshold is consistent with   a middle ear disorder, hearing loss in the stimulated ear, and/or interruption of neural innervation of the stapedius muscle.     Distortion Product Otoacoustic Emissions (DPOAEs):       Right Ear: Did not evaluate       Left Ear: Did not evaluate    Present DPOAEs suggest normal/near normal cochlear outer hair cell function and are consistent with no greater than a mild hearing loss at   those frequencies. Absent DPOAEs are consistent with abnormal cochlear outer hair cell function and some degree of hearing loss at those frequencies.    Audiometry:  Test Technique: supra-aural headphones (verified thresholds with insert headphones bilaterally). Standard Behavioral Audiometry    Reliability: Good.    Pure tone air and bone conduction audiometry:       Right Ear: mild rising to within normal limits through 750Hz sloping to severe essentially sensorineural hearing loss. Air/Bone gap present at 1000Hz       Left Ear:  mild rising to within normal limits through 750Hz sloping to severe sensorineural hearing loss.    Speech Audiometry (Word Recognition Scores):        Right Ear: Excellent; 100% at 60dBHL with 30dBHL contralateral masking.       Left Ear: Excellent; 92% at 60dBHL with 30dBHL contralateral masking.       RECOMMENDATIONS   Follow up with otolaryngology today as scheduled.  Audiologic evaluation in conjunction with otologic care, if an acute change is noted, and/or annually.  Consider hearing aids. Contact insurance to determine if there is an applicable benefit and where it can be used. Contact our office to schedule an appointment should he wish to proceed with hearing aids  through our clinic.  Follow-up with medical care team as planned.    PATIENT EDUCATION   Discussed results, impressions and recommendations with the patient. Questions were addressed and the patient was encouraged to contact our office should concerns arise.    Time for this encounter: [id number]    Rigo Brambila, [facility]  Licensed Audiologist

## 2025-08-05 NOTE — PROGRESS NOTES
Subjective   Patient ID: Whitley Tom is a 80 y.o. male who presents for follow up for ear and nose-sinus issues.    History of Present Illness    08.05.2025. He has bilateral SN hearing loss.   I recommend using hearing aids.     Secondly he has runny nose, it is clear. He has flonase and astelin.  I recommend ipratropium spray.     Plan:  1- follow up in one year with hearing test  ______________________________________________________________________      06.27.2025. Most of the time he is able to breathe well out of his nose. Decreased hearing (+) in left ear.     On examination TMs look intact. Possible effusion (+) at right.  Nasal septum deviated to left. No visible postnasal discharge.    Plan:  1- hearing test  ______________________________________________________________________ 06.28.2024: He comes for follow up. Ears mostly feel open. Breathing most of the time no problems. He still has postnasal discharge. Off and on he coughs, but overall it is better. He uses nasal sprays.     On examination, right TM is retracted and right middle ear has effusion. Left TM looks intact, no effusion. Nasal passages look open. Residual deviation to left.     Plan:  1-follow-up in 1 year  2-continue nasal sprays  Consider T-tube for right ear again, I tried it in the past, but could not place., TM severely retracted. It could be combined with myringoplasty/tympanoplasty.    _________________________________________________________________    05.28.2024: Fluid feeling in left ear is better. Right ear feels clear. He feels like his sinuses are draining pretty bad. Postnasal discharge (+). He coughs to move it. His cough is overall better.     On examination, right TM is severely retracted, effusion (+). Both TMs look intact. Left nasal passage looks tight compared to right side. Left inferior turbinate's posterior part is hypertrophic. Residual deviation of nasal septum to left (+). Mucopurulent discharge was (+)  in left middle meatus. Right middle meatus looks open and clear.    Plan:  1- oral generic augmentin tab  2- fluticasone nasal spray  3- azelastine nasal spray  4- follow up in one month  _________________________________________________________________    04.01.2024: He feels like there  is fluid in his left ear, he had septoplasty, and sinus surgery on 07.06.2023. He had a uri and has used 4 rounds of oral antibiotics.    On today's examination tube in right TM came out. Right TM looks adhesive retracted posteriorly. Left TM looks intact. I have not seen fluid on today's exam. He has bilateral type c tympanograms -196 dapa at left and -236 dapa at right.     Nasal endoscopy did not show any purulent discharge in nose. Nasal passages look open bilaterally.     He has dry cough going on for a while. There is a cough triggering point at subglottic - suprasternal area. Local steroid injection was done.    Plan:  1- follow up in May 2024, sooner if needed  _________________________________________________________________    11.27.2023: He had his hearing test today.  At left ear there is sloping type of hearing loss at high frequencies and mild slope at right ear at high and at low frequencies.  He feels like his hearing at left is not as good as his hearing at right.  Since most of his problem is sensorineural, I recommend to consider using a hearing aid for the left ear.  On examination, ventilation tube seems to be touching right middle ear mucosa, which may cause chronic inflammation over time.    Recommendations:  1-use eardrops once a week to suppress foreign body reaction at right ear  2-consider using a hearing aid for the left ear    _________________________________________________________________    10/13/2023: He comes for follow up. Nose and sinuses feel good. On examination nasal passages look open bilaterally. Tube in right TM, looks open. No granulation or discharge. Pinpoint opening at left TM  posterosuperiorly.     Plan:  1- hearing test  2- follow up in 6 months    _________________________________________________________________    10/10/2023: No nose and sinus problems essentially. Left ear feels full. He recently had a slight cold.     On examination, right TM is severely retracted and there is fluid in right middle ear. Left TM is intact.    Dx:  1- right otitis media with effusion  2- retraction of right TM  3- left ETD    Today he had insertion of right ventilation tube and left needle myringotomy with intratympanic steroid injection.    Plan:  1- ear drops  2- follow up in 2 weeks    _________________________________________________________________      08.22.2023: He had surgery on 07.06.2023.     Diagnosis:   1- deviated nasal septum,   2- michael bullosa (left),   3- chronic postnasal discharge,   4- bilateral maxillary sinusitis,   5- congestion of left inferior turbinate   6- retraction-adhesion of right tympanic membrane   7- right otitis media with effusion      Procedure:   1. septoplasty   2. reduction of left michael bullosa   3. radiofrequency reduction of inferior turbinates   4. partial ethmoidectomy   5. maxillary antrostomy   6. clarifix cryotherapy   7. right myringotomy      Findings: septum deviated to left anteriorly, deviated to right posteriorly, left michael bullosa, bilateral marcos cells, right maxillary antrum was closed. Retraction-adhesion of right TM, right OME.     On examination, there was crusting over the left inferior turbinate. Cleaning was done. Prominent left nasal septal swell body (+). There was mucopurulent discharge coming out of right middle meatus. Synechia in middle meatus bilaterally. Patient did not use the ointment and nasal rinse as instructed.     Recommendation:  1â€“Daily nasal rinse and antibiotic ointment  2â€“follow-up in 2 months, consider lyses of synechia, if symptomatic.          "  ____________________________________________________________________________________________        Mr. Tom is a 76 yo M. He comes for nose sinus issues and ear wax.  He has lots of drainage from his sinuses most of the time, mostly postnasal.   It tickles his throat and causes him to cough. This has been going on for decades.  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     History of ear tubes, years ago.  Difficulty with hearing, ears feel full.     On examination, there was wax in ears bilaterally. Cleaning was done. RIght TM is retracted and there is fluid (+). LEft TM looks essentially normal.      Nasal septum is deviated to left anteriorly and to right superiorly and posteriorly  No visible purulent postnasal discharge.      Dx:  1- Deviated nasal septum  2- RIght otitis media with effusion  3- wax build up bilateral (cleaned)     Plan:  1- septoplasty, RF turbinate reduction  2- insertion of right t -tube  3- hearing test  4- clarifix cryotherapy for postnasal thick mucoid discharge   _________________________________________________________________    04.30.2023: CT scan shows deviated nasal septum to left anteriorly and severely deviated nasal septum to right posteriorly. Congestion of right inferior turbinate, left michael bullosa, bilateral mucosal thickenings at maxillary sinuses.     Modified plan is septoplasty, reduction of left michael bullosa, RF inferior turbinate reduction, clarifix cryotherapy for postnasal discharge, limited sinus surgery [id number], [id number].      Chief Complaint     \"Suggested by Primary (ear wax) sinus\"      Adult Risk ScreeningAdult Risk Screening_[facility]: There are no spiritual/cultural practices/values/needs that are important to know   Initial Fall Risk Screening:   ZAHRA has not fallen in the last 6 months. His fall did not result in injury. ZAHRA does not have a fear of falling. He does not need assistance with " sitting, standing or walking. Does not need assistance walking in his home. He does not need assistance in an unfamiliar setting. The patient is not using an assistive device.        Domestic Violence Screen: Does not feel threatened or abused physically, emotionally or sexually. Do you feel UNSAFE?   The patient feels safe in the home.   Depression/Suicide Screening:   During the past 2 weeks, the patient has not felt down, depressed or hopeless.    During the past 2 weeks, the patient has not felt little interest or pleasure in doing things.       Review of Systems  History of rar fullness (+)    Objective   Physical Exam  (old exam note)     General appearance: Healthy-appearing, well-nourished, well groomed, in no acute distress.      Head and Face: Atraumatic with no masses, lesions, or scarring.      Salivary glands: No tenderness of the parotid glands or parotid masses.      No tenderness of the submandibular glands or submandibular masses.      Facial strength: Normal strength and symmetry, no synkinesis or facial tic.      Eyes: Conjunctivas look non-hyperemic bilaterally     Ears: Bilaterally ear canals look normal. Tympanic membranes intact, no hyperemia, fluid or retraction.      Nose: Mucosa looks normal. No purulent discharge. Septum deviated to left / right / essentially straight.     Oral Cavity/Mouth: Lips and tongue look normal.      Throat: No postnasal discharge. No tonsil hypertrophy. No hyperemia.     Neck: Symmetrical, trachea midline.      Pulmonary: Normal respiratory effort.      Lymphatic: No palpable pathologic lymph nodes at neck.      Neurological/Psychiatric: Orientation to person, place, and time: Normal.   Mood and affect: Normal.      Extremities: No clubbing.        Procedure    BILATERAL NEEDLE MYRINGOTOMY and INTRATYMPANIC STEROID INJECTION 04.01.2024    Operative microscope was brought to patient's left ear. Topical phenol was applied posteriorly, then myringotomy was done using  25 g needle and ~0.4 ml 10mg/ml dexamethasone was injected intratympanically. Antibiotic drops were applied.    Operative microscope was brought to patient's right ear. Topical phenol was applied posteriorly, then myringotomy was done using 27 g needle and ~0.2 ml 10mg/ml dexamethasone was injected intratympanically. Antibiotic drops were applied.    _________________________________________________________________    INJECTION NOTE 04.01.2024  ~10 mg dexamethasone mixed with local anesthetics was injected to suprasternal, subglottic area towards the external branches of superior laryngeal nerve for subacute dry cough.    _________________________________________________________________    LEFT NEEDLE MYRINGOTOMY and INTRATYMPANIC STEROID INJECTION 10/10/2023  Operative microscope was brought to patient's left ear. Topical phenol was applied posteriorly, then myringotomy was done using 25 g needle and ~0.4 ml 10mg/ml dexamethasone was injected intratympanically. Antibiotic drops were applied.    RIGHT VENTILATION TUBE INSERTION 10/10/2023  Operative microscope was brought to patient's right ear. Topical phenol was applied anteroinferiorly, then myringotomy was done using 21 g needle. Fluid was suctioned. Brad bobbin ventilation tube was inserted. Antibiotic drops were applied.    Procedure was concluded.       Assessment/Plan     08.05.2025. He has bilateral SN hearing loss.   I recommend using hearing aids.     Secondly he has runny nose, it is clear. He has flonase and astelin.  I recommend ipratropium spray.     Plan:  1- follow up in one year with hearing test  ______________________________________________________________________    06.27.2025. Most of the time he is able to breathe well out of his nose. Decreased hearing (+) in left ear.     On examination TMs look intact. Possible effusion (+) at right.  Nasal septum deviated to left. No visible postnasal discharge.    Plan:  1- hearing  test  ______________________________________________________________________    06.28.2024: He comes for follow up. Ears mostly feel open. Breathing most of the time no problems. He still has postnasal discharge. Off and on he coughs, but overall it is better. He uses nasal sprays.     On examination, right TM is retracted and right middle ear has effusion. Left TM looks intact, no effusion. Nasal passages look open. Residual deviation to left.     Plan:  1-follow-up in 1 year  2-continue nasal sprays  Consider T-tube for right ear again, I tried it in the past, but could not place., TM severely retracted. It could be combined with myringoplasty/tympanoplasty.    _________________________________________________________________ 05.28.2024: Fluid feeling in left ear is better. Right ear feels clear. He feels like his sinuses are draining pretty bad. Postnasal discharge (+). He coughs to move it. His cough is overall better.     On examination, right TM is severely retracted, effusion (+). Both TMs look intact. Left nasal passage looks tight compared to right side. Left inferior turbinate's posterior part is hypertrophic. Residual deviation of nasal septum to left (+). Mucopurulent discharge was (+) in left middle meatus. Right middle meatus looks open and clear.    Plan:  1- oral generic augmentin tab  2- fluticasone nasal spray  3- azelastine nasal spray  4- follow up in one month  _________________________________________________________________    04.01.2024: He feels like there  is fluid in his left ear, he had septoplasty, and sinus surgery on 07.06.2023. He had a uri and has used 4 rounds of oral antibiotics.    On today's examination tube in right TM came out. Right TM looks adhesive retracted posteriorly. Left TM looks intact. I have not seen fluid on today's exam. He has bilateral type c tympanograms -196 dapa at left and -236 dapa at right.     Nasal endoscopy did not show any purulent discharge in nose.  Nasal passages look open bilaterally.     He has dry cough going on for a while. There is a cough triggering point at subglottic - suprasternal area. Local steroid injection was done.    Plan:  1- follow up in May 2024, sooner if needed  _________________________________________________________________    11.27.2023: He had his hearing test today.  At left ear there is sloping type of hearing loss at high frequencies and mild slope at right ear at high and at low frequencies.  He feels like his hearing at left is not good good as his hearing at right.  Since most of his problem is sensorineural, I recommend to consider using a hearing aid for the left ear.  On examination, ventilation tube seems to be touching right middle ear mucosa, which may cause chronic inflammation over time.    Recommendations:  1-use eardrops once a week to suppress foreign body reaction at right ear  2-consider using a hearing aid for the left ear    _________________________________________________________________    10/13/2023: He comes for follow up. Nose and sinuses feel good. On examination nasal passages look open bilaterally. Tube in right TM, looks open. No granulation or discharge. Pinpoint opening at left TM posterosuperiorly.     Plan:  1- hearing test  2- follow up in 6 months    _________________________________________________________________    10/10/2023: No nose and sinus problems essentially. Left ear feels full. He recently had a slight cold.     On examination, right TM is severely retracted and there is fluid in right middle ear. Left TM is intact.    Dx:  1- right otitis media with effusion  2- retraction of right TM  3- left ETD    Today he had insertion of right ventilation tube and left needle myringotomy with intratympanic steroid injection.    Plan:  1- ear drops  2- follow up in 2 weeks

## 2025-08-30 DIAGNOSIS — R09.89 RUNNY NOSE: ICD-10-CM

## 2025-09-02 RX ORDER — IPRATROPIUM BROMIDE 42 UG/1
SPRAY, METERED NASAL
Qty: 15 ML | Refills: 1 | Status: SHIPPED | OUTPATIENT
Start: 2025-09-02

## 2025-09-25 ENCOUNTER — APPOINTMENT (OUTPATIENT)
Dept: PRIMARY CARE | Facility: CLINIC | Age: 80
End: 2025-09-25
Payer: MEDICARE

## 2026-01-30 ENCOUNTER — APPOINTMENT (OUTPATIENT)
Facility: CLINIC | Age: 81
End: 2026-01-30
Payer: MEDICARE

## 2026-08-03 ENCOUNTER — APPOINTMENT (OUTPATIENT)
Dept: AUDIOLOGY | Facility: CLINIC | Age: 81
End: 2026-08-03
Payer: MEDICARE

## 2026-08-03 ENCOUNTER — APPOINTMENT (OUTPATIENT)
Dept: OTOLARYNGOLOGY | Facility: CLINIC | Age: 81
End: 2026-08-03
Payer: MEDICARE